# Patient Record
Sex: FEMALE | Race: WHITE | NOT HISPANIC OR LATINO | Employment: UNEMPLOYED | URBAN - METROPOLITAN AREA
[De-identification: names, ages, dates, MRNs, and addresses within clinical notes are randomized per-mention and may not be internally consistent; named-entity substitution may affect disease eponyms.]

---

## 2017-02-28 ENCOUNTER — HOSPITAL ENCOUNTER (EMERGENCY)
Facility: HOSPITAL | Age: 37
Discharge: HOME | End: 2017-02-28

## 2017-02-28 ENCOUNTER — APPOINTMENT (EMERGENCY)
Dept: RADIOLOGY | Facility: HOSPITAL | Age: 37
End: 2017-02-28

## 2017-02-28 VITALS
RESPIRATION RATE: 18 BRPM | HEART RATE: 104 BPM | OXYGEN SATURATION: 99 % | WEIGHT: 219 LBS | HEIGHT: 64 IN | BODY MASS INDEX: 37.39 KG/M2 | TEMPERATURE: 98.4 F | SYSTOLIC BLOOD PRESSURE: 144 MMHG | DIASTOLIC BLOOD PRESSURE: 80 MMHG
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
## 2017-02-28 DIAGNOSIS — M54.9 UPPER BACK PAIN: ICD-10-CM

## 2017-02-28 DIAGNOSIS — S16.1XXA CERVICAL STRAIN, INITIAL ENCOUNTER: Primary | ICD-10-CM

## 2017-02-28 PROCEDURE — 72040 X-RAY EXAM NECK SPINE 2-3 VW: CPT

## 2017-02-28 PROCEDURE — 99284 EMERGENCY DEPT VISIT MOD MDM: CPT

## 2017-02-28 PROCEDURE — 73030 X-RAY EXAM OF SHOULDER: CPT

## 2017-02-28 RX ORDER — METAXALONE 800 MG/1
800 TABLET ORAL 3 TIMES DAILY
Qty: 15 TABLET | Refills: 0 | Status: SHIPPED | OUTPATIENT
Start: 2017-02-28 | End: 2022-02-11

## 2017-02-28 RX ORDER — IBUPROFEN 600 MG/1
600 TABLET ORAL EVERY 6 HOURS PRN
Qty: 30 TABLET | Refills: 0 | Status: SHIPPED | OUTPATIENT
Start: 2017-02-28 | End: 2017-03-05

## 2017-02-28 RX ORDER — OXYCODONE HYDROCHLORIDE 5 MG/1
10 CAPSULE ORAL EVERY 6 HOURS PRN
COMMUNITY
End: 2022-02-11

## 2018-12-21 ENCOUNTER — OFFICE VISIT (OUTPATIENT)
Dept: URGENT CARE | Facility: MEDICAL CENTER | Age: 38
End: 2018-12-21
Payer: COMMERCIAL

## 2018-12-21 VITALS
SYSTOLIC BLOOD PRESSURE: 132 MMHG | BODY MASS INDEX: 36.43 KG/M2 | HEIGHT: 64 IN | RESPIRATION RATE: 20 BRPM | DIASTOLIC BLOOD PRESSURE: 78 MMHG | WEIGHT: 213.4 LBS | HEART RATE: 109 BPM | TEMPERATURE: 98.1 F | OXYGEN SATURATION: 99 %

## 2018-12-21 DIAGNOSIS — R05.9 COUGH: Primary | ICD-10-CM

## 2018-12-21 PROCEDURE — G0382 LEV 3 HOSP TYPE B ED VISIT: HCPCS | Performed by: PHYSICIAN ASSISTANT

## 2018-12-21 PROCEDURE — 99283 EMERGENCY DEPT VISIT LOW MDM: CPT | Performed by: PHYSICIAN ASSISTANT

## 2018-12-21 RX ORDER — SULFAMETHOXAZOLE AND TRIMETHOPRIM 800; 160 MG/1; MG/1
1 TABLET ORAL EVERY 12 HOURS SCHEDULED
Qty: 14 TABLET | Refills: 0 | Status: SHIPPED | OUTPATIENT
Start: 2018-12-21 | End: 2018-12-28

## 2018-12-21 RX ORDER — CETIRIZINE HYDROCHLORIDE, PSEUDOEPHEDRINE HYDROCHLORIDE 5; 120 MG/1; MG/1
TABLET, FILM COATED, EXTENDED RELEASE ORAL
COMMUNITY
Start: 2018-08-07

## 2018-12-21 RX ORDER — BENZONATATE 100 MG/1
100 CAPSULE ORAL 3 TIMES DAILY PRN
Qty: 20 CAPSULE | Refills: 0 | Status: SHIPPED | OUTPATIENT
Start: 2018-12-21 | End: 2022-02-11

## 2018-12-21 RX ORDER — KETOROLAC TROMETHAMINE 10 MG/1
TABLET, FILM COATED ORAL
COMMUNITY
End: 2022-02-11

## 2018-12-21 RX ORDER — BENZONATATE 200 MG/1
1 CAPSULE ORAL 3 TIMES DAILY PRN
COMMUNITY
Start: 2015-10-27 | End: 2022-02-11

## 2018-12-21 RX ORDER — ALBUTEROL SULFATE 90 UG/1
AEROSOL, METERED RESPIRATORY (INHALATION)
COMMUNITY

## 2018-12-21 RX ORDER — ALBUTEROL SULFATE 90 UG/1
2 AEROSOL, METERED RESPIRATORY (INHALATION) EVERY 6 HOURS PRN
Qty: 8.5 G | Refills: 0 | Status: SHIPPED | OUTPATIENT
Start: 2018-12-21

## 2018-12-22 NOTE — PROGRESS NOTES
[facility]        NAME: Carlin Bowie is a 41 y o  female  : 1980    MRN: [id number]  DATE: 2018  TIME: 9:43 PM    Assessment and Plan   Cough [R05]  1  Cough  sulfamethoxazole-trimethoprim (BACTRIM DS) 800-160 mg per tablet    albuterol (PROAIR HFA) 90 mcg/act inhaler         Patient Instructions       Follow up with PCP in 3-5 days  Proceed to  ER if symptoms worsen  Chief Complaint     Chief Complaint   Patient presents with    Cold Like Symptoms         History of Present Illness       HPI    Review of Systems   Review of Systems      Current Medications       Current Outpatient Prescriptions:     cetirizine-pseudoephedrine (ZyrTEC-D) 5-120 MG per tablet, TAKE 1 TABLET BY MOUTH 2 (TWO) TIMES A DAY FOR 10 DAYS , Disp: , Rfl:     lisdexamfetamine (VYVANSE) 70 MG capsule, Take 70 mg by mouth every morning, Disp: , Rfl:     Norethin-Eth Estrad-Fe Biphas (LO LOESTRIN FE) 1 MG-10 MCG / 10 MCG TABS, Take by mouth, Disp: , Rfl:     albuterol (PROAIR HFA) 90 mcg/act inhaler, Inhale 2 puffs every 6 (six) hours as needed for wheezing, Disp: 8 5 g, Rfl: 0    albuterol (PROVENTIL HFA,VENTOLIN HFA) 90 mcg/act inhaler, Inhale, Disp: , Rfl:     Ascorbic Acid (VITAMIN C) 100 MG tablet, Take 100 mg by mouth daily  , Disp: , Rfl:     benzonatate (TESSALON) 200 MG capsule, Take 1 capsule by mouth 3 (three) times a day as needed, Disp: , Rfl:     Biotin 300 MCG TABS, Take by mouth , Disp: , Rfl:     DULoxetine (CYMBALTA) 60 mg delayed release capsule, Take 60 mg by mouth daily  , Disp: , Rfl:     ibuprofen (MOTRIN) 600 mg tablet, Take 1 tablet by mouth every 6 (six) hours as needed for mild pain for up to 5 days, Disp: 30 tablet, Rfl: 0    ketorolac (TORADOL) 10 mg tablet, Take by mouth, Disp: , Rfl:     metaxalone (SKELAXIN) 800 mg tablet, Take 1 tablet by mouth 3 (three) times a day for 5 days, Disp: 15 tablet, Rfl: 0    oxyCODONE (OXY-IR) 5 MG capsule, Take 10 mg by mouth every 6 (six) hours as needed for moderate pain, Disp: , Rfl:     Specialty Vitamins Products (MAGNESIUM, AMINO ACID CHELATE,) 133 MG tablet, Take 1 tablet by mouth 2 (two) times a day , Disp: , Rfl:     sulfamethoxazole-trimethoprim (BACTRIM DS) 800-160 mg per tablet, Take 1 tablet by mouth every 12 (twelve) hours for 7 days, Disp: 14 tablet, Rfl: 0    Current Allergies     Allergies as of 12/21/2018 - Reviewed 12/21/2018   Allergen Reaction Noted    Penicillins Anaphylaxis 04/30/2016    Ambien [zolpidem tartrate]  04/30/2016    Amitriptyline Hives 04/30/2016    Benadryl [diphenhydramine hcl (sleep)]  04/30/2016    Ceclor [cefaclor] Hives 04/30/2016    Ciprofibrate Hives 04/20/2018    Doxycycline GI Intolerance 08/10/2015    Erythromycin Other (See Comments) 08/10/2015    Gabapentin Other (See Comments) 08/10/2015    Lyrica [pregabalin]  04/30/2016    Morphine  03/09/2017    Morphine and related  04/30/2016    Other  04/30/2016    Ultram [tramadol]  04/30/2016            The following portions of the patient's history were reviewed and updated as appropriate: allergies, current medications, past family history, past medical history, past social history, past surgical history and problem list      Past Medical History:   Diagnosis Date    ADHD (attention deficit hyperactivity disorder)     Allergic     Asthma     Chronic pain     Fibroids     Migraine     PCOS (polycystic ovarian syndrome)     RSD (reflex sympathetic dystrophy)        History reviewed  No pertinent surgical history  No family history on file  Medications have been verified          Objective   /78   Pulse (!) 109   Temp 98 1 °F (36 7 °C) (Temporal)   Resp 20   Ht 5' 4" (1 626 m)   Wt 96 8 kg (213 lb 6 4 oz)   LMP 12/16/2018 (Approximate)   SpO2 99%   BMI 36 63 kg/m²        Physical Exam     Physical Exam

## 2018-12-22 NOTE — PROGRESS NOTES
[facility]        NAME: Yesica Morse is a 41 y o  female  : 1980    MRN: [id number]  DATE: 2018  TIME: 9:43 PM    Assessment and Plan   Cough [R05]  1  Cough  sulfamethoxazole-trimethoprim (BACTRIM DS) 800-160 mg per tablet    albuterol (PROAIR HFA) 90 mcg/act inhaler         Patient Instructions     Cough  Bactrim DS twice daily x 7 days  proventil 2 puffs every 6 hours as needed  Follow up with PCP in 3-5 days  Proceed to  ER if symptoms worsen  Chief Complaint     Chief Complaint   Patient presents with    Cold Like Symptoms         History of Present Illness       42 y/o female c/o cough productive of yellow sputum x 3 weeks  Patient denies chest pain, SOB, n/v        Review of Systems   Review of Systems   Constitutional: Positive for fever  Negative for activity change, appetite change, chills, diaphoresis and fatigue  HENT: Positive for congestion and rhinorrhea  Negative for ear discharge, ear pain, facial swelling, hearing loss, mouth sores, nosebleeds, postnasal drip, sinus pain, sinus pressure, sneezing, sore throat and voice change  Respiratory: Positive for cough  Negative for apnea, choking, chest tightness, shortness of breath, wheezing and stridor  Cardiovascular: Negative  Current Medications       Current Outpatient Prescriptions:     cetirizine-pseudoephedrine (ZyrTEC-D) 5-120 MG per tablet, TAKE 1 TABLET BY MOUTH 2 (TWO) TIMES A DAY FOR 10 DAYS , Disp: , Rfl:     lisdexamfetamine (VYVANSE) 70 MG capsule, Take 70 mg by mouth every morning, Disp: , Rfl:     Norethin-Eth Estrad-Fe Biphas (LO LOESTRIN FE) 1 MG-10 MCG / 10 MCG TABS, Take by mouth, Disp: , Rfl:     albuterol (PROAIR HFA) 90 mcg/act inhaler, Inhale 2 puffs every 6 (six) hours as needed for wheezing, Disp: 8 5 g, Rfl: 0    albuterol (PROVENTIL HFA,VENTOLIN HFA) 90 mcg/act inhaler, Inhale, Disp: , Rfl:     Ascorbic Acid (VITAMIN C) 100 MG tablet, Take 100 mg by mouth daily  , Disp: , Rfl:     benzonatate (TESSALON) 200 MG capsule, Take 1 capsule by mouth 3 (three) times a day as needed, Disp: , Rfl:     Biotin 300 MCG TABS, Take by mouth , Disp: , Rfl:     DULoxetine (CYMBALTA) 60 mg delayed release capsule, Take 60 mg by mouth daily  , Disp: , Rfl:     ibuprofen (MOTRIN) 600 mg tablet, Take 1 tablet by mouth every 6 (six) hours as needed for mild pain for up to 5 days, Disp: 30 tablet, Rfl: 0    ketorolac (TORADOL) 10 mg tablet, Take by mouth, Disp: , Rfl:     metaxalone (SKELAXIN) 800 mg tablet, Take 1 tablet by mouth 3 (three) times a day for 5 days, Disp: 15 tablet, Rfl: 0    oxyCODONE (OXY-IR) 5 MG capsule, Take 10 mg by mouth every 6 (six) hours as needed for moderate pain, Disp: , Rfl:     Specialty Vitamins Products (MAGNESIUM, AMINO ACID CHELATE,) 133 MG tablet, Take 1 tablet by mouth 2 (two) times a day , Disp: , Rfl:     sulfamethoxazole-trimethoprim (BACTRIM DS) 800-160 mg per tablet, Take 1 tablet by mouth every 12 (twelve) hours for 7 days, Disp: 14 tablet, Rfl: 0    Current Allergies     Allergies as of 12/21/2018 - Reviewed 12/21/2018   Allergen Reaction Noted    Penicillins Anaphylaxis 04/30/2016    Ambien [zolpidem tartrate]  04/30/2016    Amitriptyline Hives 04/30/2016    Benadryl [diphenhydramine hcl (sleep)]  04/30/2016    Ceclor [cefaclor] Hives 04/30/2016    Ciprofibrate Hives 04/20/2018    Doxycycline GI Intolerance 08/10/2015    Erythromycin Other (See Comments) 08/10/2015    Gabapentin Other (See Comments) 08/10/2015    Lyrica [pregabalin]  04/30/2016    Morphine  03/09/2017    Morphine and related  04/30/2016    Other  04/30/2016    Ultram [tramadol]  04/30/2016            The following portions of the patient's history were reviewed and updated as appropriate: allergies, current medications, past family history, past medical history, past social history, past surgical history and problem list      Past Medical History:   Diagnosis Date    ADHD (attention deficit hyperactivity disorder)     Allergic     Asthma     Chronic pain     Fibroids     Migraine     PCOS (polycystic ovarian syndrome)     RSD (reflex sympathetic dystrophy)        History reviewed  No pertinent surgical history  No family history on file  Medications have been verified  Objective   /78   Pulse (!) 109   Temp 98 1 °F (36 7 °C) (Temporal)   Resp 20   Ht 5' 4" (1 626 m)   Wt 96 8 kg (213 lb 6 4 oz)   LMP 12/16/2018 (Approximate)   SpO2 99%   BMI 36 63 kg/m²        Physical Exam     Physical Exam   Constitutional: She appears well-developed and well-nourished  No distress  HENT:   Head: Normocephalic and atraumatic  Right Ear: Hearing, tympanic membrane, external ear and ear canal normal    Left Ear: Hearing, tympanic membrane, external ear and ear canal normal    Nose: Rhinorrhea present  Mouth/Throat: Uvula is midline, oropharynx is clear and moist and mucous membranes are normal    Neck: Normal range of motion  Neck supple  Cardiovascular: Normal rate, regular rhythm, normal heart sounds and intact distal pulses  Pulmonary/Chest: Effort normal and breath sounds normal    Lymphadenopathy:     She has cervical adenopathy  Skin: She is not diaphoretic

## 2018-12-22 NOTE — PATIENT INSTRUCTIONS
Cough  Bactrim DS twice daily x 7 days  proventil 2 puffs every 6 hours as needed  Follow up with PCP in 3-5 days  Proceed to  ER if symptoms worsen  Acute Cough   WHAT YOU NEED TO KNOW:   An acute cough can last up to 3 weeks  Common causes of an acute cough include a cold, allergies, or a lung infection  DISCHARGE INSTRUCTIONS:   Return to the emergency department if:   · You have trouble breathing or feel short of breath  · You cough up blood, or you see blood in your mucus  · You faint or feel weak or dizzy  · You have chest pain when you cough or take a deep breath  · You have new wheezing  Contact your healthcare provider if:   · You have a fever  · Your cough lasts longer than 4 weeks  · Your symptoms do not improve with treatment  · You have questions or concerns about your condition or care  Medicines:   · Medicines  may be needed to stop the cough, decrease swelling in your airways, or help open your airways  Medicine may also be given to help you cough up mucus  Ask your healthcare provider what over-the-counter medicines you can take  If you have an infection caused by bacteria, you may need antibiotics  · Take your medicine as directed  Contact your healthcare provider if you think your medicine is not helping or if you have side effects  Tell him or her if you are allergic to any medicine  Keep a list of the medicines, vitamins, and herbs you take  Include the amounts, and when and why you take them  Bring the list or the pill bottles to follow-up visits  Carry your medicine list with you in case of an emergency  Manage your symptoms:   · Do not smoke and stay away from others who smoke  Nicotine and other chemicals in cigarettes and cigars can cause lung damage and make your cough worse  Ask your healthcare provider for information if you currently smoke and need help to quit  E-cigarettes or smokeless tobacco still contain nicotine   Talk to your healthcare provider before you use these products  · Drink extra liquids as directed  Liquids will help thin and loosen mucus so you can cough it up  Liquids will also help prevent dehydration  Examples of good liquids to drink include water, fruit juice, and broth  Do not drink liquids that contain caffeine  Caffeine can increase your risk for dehydration  Ask your healthcare provider how much liquid to drink each day  · Rest as directed  Do not do activities that make your cough worse, such as exercise  · Use a humidifier or vaporizer  Use a cool mist humidifier or a vaporizer to increase air moisture in your home  This may make it easier for you to breathe and help decrease your cough  · Eat 2 to 5 mL of honey 2 times each day  Honey can help thin mucus and decrease your cough  · Use cough drops or lozenges  These can help decrease throat irritation and your cough  Follow up with your healthcare provider as directed:  Write down your questions so you remember to ask them during your visits  © 2017 [facility] Information is for End User's use only and may not be sold, redistributed or otherwise used for commercial purposes  All illustrations and images included in CareNotes® are the copyrighted property of [employer]  or [employer]  The above information is an  only  It is not intended as medical advice for individual conditions or treatments  Talk to your doctor, nurse or pharmacist before following any medical regimen to see if it is safe and effective for you

## 2019-06-10 ENCOUNTER — HOSPITAL ENCOUNTER (EMERGENCY)
Facility: HOSPITAL | Age: 39
Discharge: HOME | End: 2019-06-10
Attending: EMERGENCY MEDICINE | Admitting: EMERGENCY MEDICINE
Payer: COMMERCIAL

## 2019-06-10 VITALS
HEART RATE: 117 BPM | BODY MASS INDEX: 37.9 KG/M2 | SYSTOLIC BLOOD PRESSURE: 171 MMHG | DIASTOLIC BLOOD PRESSURE: 105 MMHG | TEMPERATURE: 98.4 F | RESPIRATION RATE: 20 BRPM | WEIGHT: 222 LBS | HEIGHT: 64 IN | OXYGEN SATURATION: 99 %

## 2019-06-10 DIAGNOSIS — R51.9 HEADACHE: Primary | ICD-10-CM

## 2019-06-10 PROCEDURE — 99283 EMERGENCY DEPT VISIT LOW MDM: CPT

## 2019-06-10 PROCEDURE — 99282 EMERGENCY DEPT VISIT SF MDM: CPT | Performed by: EMERGENCY MEDICINE

## 2020-03-09 ENCOUNTER — TELEPHONE (OUTPATIENT)
Dept: OBGYN CLINIC | Facility: CLINIC | Age: 40
End: 2020-03-09

## 2020-03-11 DIAGNOSIS — R10.2 PELVIC PAIN: Primary | ICD-10-CM

## 2020-04-02 ENCOUNTER — TELEMEDICINE (OUTPATIENT)
Dept: OBGYN CLINIC | Facility: CLINIC | Age: 40
End: 2020-04-02
Payer: COMMERCIAL

## 2020-04-02 DIAGNOSIS — Z30.011 ENCOUNTER FOR INITIAL PRESCRIPTION OF CONTRACEPTIVE PILLS: ICD-10-CM

## 2020-04-02 DIAGNOSIS — R14.0 ABDOMINAL DISTENSION: ICD-10-CM

## 2020-04-02 DIAGNOSIS — R10.2 PELVIC PAIN: Primary | ICD-10-CM

## 2020-04-02 PROCEDURE — G2012 BRIEF CHECK IN BY MD/QHP: HCPCS | Performed by: OBSTETRICS & GYNECOLOGY

## 2020-04-02 RX ORDER — NORETHINDRONE ACETATE AND ETHINYL ESTRADIOL 1MG-20(21)
1 KIT ORAL DAILY
Qty: 28 TABLET | Refills: 3 | Status: SHIPPED | OUTPATIENT
Start: 2020-04-02 | End: 2022-02-11

## 2020-04-02 RX ORDER — SENNOSIDES 8.6 MG
650 CAPSULE ORAL EVERY 8 HOURS PRN
Qty: 30 TABLET | Refills: 0 | Status: SHIPPED | OUTPATIENT
Start: 2020-04-02 | End: 2022-02-11

## 2020-04-07 ENCOUNTER — TELEPHONE (OUTPATIENT)
Dept: OBGYN CLINIC | Facility: CLINIC | Age: 40
End: 2020-04-07

## 2020-04-07 DIAGNOSIS — Z72.51 UNPROTECTED SEXUAL INTERCOURSE: Primary | ICD-10-CM

## 2020-04-08 RX ORDER — LEVONORGESTREL 1.5 MG/1
1.5 TABLET ORAL ONCE
Qty: 1 TABLET | Refills: 0 | Status: SHIPPED | OUTPATIENT
Start: 2020-04-08 | End: 2020-04-08

## 2020-04-21 ENCOUNTER — TELEPHONE (OUTPATIENT)
Dept: OBGYN CLINIC | Facility: CLINIC | Age: 40
End: 2020-04-21

## 2020-07-14 ENCOUNTER — HOSPITAL ENCOUNTER (OUTPATIENT)
Dept: CT IMAGING | Facility: CLINIC | Age: 40
Discharge: HOME | End: 2020-07-14
Payer: COMMERCIAL

## 2020-07-14 DIAGNOSIS — R10.2 PELVIC PAIN: ICD-10-CM

## 2020-07-14 DIAGNOSIS — R14.0 ABDOMINAL DISTENSION: ICD-10-CM

## 2020-07-14 PROCEDURE — 74176 CT ABD & PELVIS W/O CONTRAST: CPT

## 2020-07-22 ENCOUNTER — TELEPHONE (OUTPATIENT)
Dept: OBGYN CLINIC | Facility: CLINIC | Age: 40
End: 2020-07-22

## 2020-07-27 ENCOUNTER — CONSULT (OUTPATIENT)
Dept: OBGYN CLINIC | Facility: CLINIC | Age: 40
End: 2020-07-27
Payer: COMMERCIAL

## 2020-07-27 VITALS
BODY MASS INDEX: 42.34 KG/M2 | WEIGHT: 248 LBS | SYSTOLIC BLOOD PRESSURE: 138 MMHG | HEIGHT: 64 IN | TEMPERATURE: 97.6 F | DIASTOLIC BLOOD PRESSURE: 90 MMHG

## 2020-07-27 DIAGNOSIS — M62.08 DIASTASIS OF RECTUS ABDOMINIS: ICD-10-CM

## 2020-07-27 DIAGNOSIS — O34.219 DELIVERY WITH HISTORY OF C-SECTION: Primary | ICD-10-CM

## 2020-07-27 DIAGNOSIS — R10.9 ABDOMINAL PAIN, UNSPECIFIED ABDOMINAL LOCATION: Primary | ICD-10-CM

## 2020-07-27 DIAGNOSIS — I10 HYPERTENSION, UNSPECIFIED TYPE: ICD-10-CM

## 2020-07-27 PROCEDURE — 99213 OFFICE O/P EST LOW 20 MIN: CPT | Performed by: OBSTETRICS & GYNECOLOGY

## 2020-07-27 RX ORDER — LABETALOL 100 MG/1
100 TABLET, FILM COATED ORAL 2 TIMES DAILY
Qty: 60 TABLET | Refills: 1 | Status: SHIPPED | OUTPATIENT
Start: 2020-07-27 | End: 2021-01-08

## 2020-07-27 NOTE — PROGRESS NOTES
Assessment/Plan:     Diagnoses and all orders for this visit:    Abdominal pain, unspecified abdominal location  -     MRI pelvis w wo contrast; Future  -     MRI abdomen w wo contrast; Future    Diastasis of rectus abdominis  -     Ambulatory referral to General Surgery; Future      19-year-old female  Lower pelvic pain, upper abdominal pain  Diastasis of rectus muscle  Left adnexal mass  PCOS  Reflex sympathetic dystrophy  ADHD  Hypertension  Hypothyroid  Smoker  Prior  complicated by postpartum hemorrhage receive blood transfusion  Plan   CT scan results review with patient recommend surgical evaluation for possible reapproximation of her rectus muscle  MRI evaluate adnexal mass check for any abnormality  Will consider call manage with general surgeon secondary to her prior multiple adhesions at the time of her  and combined surgery for a rectus muscle reapproximation as well as removal of adnexal mass if show persistent or any abnormality at the MRI return to office in 4 weeks for annual exam and to discuss management plan  Subjective:      Patient ID: Irving Mai is a 47 y o  female      HPI  Irving Mai is a 42 y o  female who had emergency  secondary to severe preeclampsia in 2020 patient's  was complicated by bleeding hemorrhage receive blood transfusion post , patient also was having elevated blood pressure was taking labetalol   as per patient her last blood pressure was 130s over 80s without medication denies any headache blurry vision, patient said her menses become heavy since she gave birth lasted 7 days passing clots changing pad every 15-30 minute, patient started on oral contraceptive pills secondary to menorrhagia patient said her menses is not regular was having breakthrough bleeding on OCP but the heaviness of her bleeding decrease, patient desire to continue oral contraceptive pills       patient also still complaining of bulging in her lower abdominal area comes and go and distension happen on the upper part of the incision patient instructed to take photos before and after tissue was in her next visit CT scan results discussed with patient finding of diastasis of the rectus muscle discussed with patient recommend general surgery evaluation, patient also complaining of area in her lower abdomen that bulge back and forth possibility of endometriosis as well explained and discussed with patient patient denies any nausea or vomiting denies any constipation patient is having occasional diarrhea but her pain is not related to bowel movements or urinary function pain is increased with her menses  All finding explained and discussed with patient in details the plan will be to refer to general surgeon as well as order MRI evaluate adnexal mass then to return to office in 4 weeks after MRI to discuss results annual exam and call manage with general surgeon after evaluation plan explained and discussed with patient in details in the meanwhile also abdominal binder would be recommended all patient questions answered patient was satisfied time spent with patient was 15 minutes more than 50% of the time was face-to-face counseling            sReview of Systems      Objective:      /90 (BP Location: Left arm, Patient Position: Sitting)   Temp 97 6 °F (36 4 °C) (Temporal)   Ht 5' 4" (1 626 m)   Wt 112 kg (248 lb)   BMI 42 57 kg/m²          Physical Exam   Constitutional: She is oriented to person, place, and time  She appears well-developed and well-nourished  Cardiovascular: Normal rate, regular rhythm and normal heart sounds  Pulmonary/Chest: Effort normal and breath sounds normal    Abdominal: Soft  She exhibits no distension  There is no tenderness  Neurological: She is alert and oriented to person, place, and time  Psychiatric: She has a normal mood and affect   Her behavior is normal

## 2020-08-06 ENCOUNTER — TELEPHONE (OUTPATIENT)
Dept: OBGYN CLINIC | Facility: CLINIC | Age: 40
End: 2020-08-06

## 2020-08-06 NOTE — TELEPHONE ENCOUNTER
Prior Authorization started for Mri pelvic/abdomen  With [facility]   CPT code- mri pelvis- 14736  CPT code MRI abdomen 79089  icd 10 r10 9    Pending prior auth # [id number]  Faxed clinicals to [phone]

## 2021-01-08 DIAGNOSIS — I10 HYPERTENSION, UNSPECIFIED TYPE: ICD-10-CM

## 2021-01-08 RX ORDER — LABETALOL 100 MG/1
TABLET, FILM COATED ORAL
Qty: 60 TABLET | Refills: 1 | Status: SHIPPED | OUTPATIENT
Start: 2021-01-08 | End: 2022-02-11

## 2022-02-11 ENCOUNTER — OFFICE VISIT (OUTPATIENT)
Dept: INTERNAL MEDICINE CLINIC | Facility: CLINIC | Age: 42
End: 2022-02-11
Payer: COMMERCIAL

## 2022-02-11 VITALS
BODY MASS INDEX: 38.04 KG/M2 | DIASTOLIC BLOOD PRESSURE: 98 MMHG | SYSTOLIC BLOOD PRESSURE: 128 MMHG | WEIGHT: 222.8 LBS | HEIGHT: 64 IN | OXYGEN SATURATION: 98 % | HEART RATE: 118 BPM | TEMPERATURE: 98.9 F

## 2022-02-11 DIAGNOSIS — O24.419 GESTATIONAL DIABETES MELLITUS (GDM), ANTEPARTUM, GESTATIONAL DIABETES METHOD OF CONTROL UNSPECIFIED: ICD-10-CM

## 2022-02-11 DIAGNOSIS — E28.2 PCOS (POLYCYSTIC OVARIAN SYNDROME): ICD-10-CM

## 2022-02-11 DIAGNOSIS — Z11.4 ENCOUNTER FOR SCREENING FOR HIV: ICD-10-CM

## 2022-02-11 DIAGNOSIS — Z13.6 ENCOUNTER FOR LIPID SCREENING FOR CARDIOVASCULAR DISEASE: ICD-10-CM

## 2022-02-11 DIAGNOSIS — I10 HYPERTENSION, UNSPECIFIED TYPE: ICD-10-CM

## 2022-02-11 DIAGNOSIS — G90.50 RSD (REFLEX SYMPATHETIC DYSTROPHY): ICD-10-CM

## 2022-02-11 DIAGNOSIS — Z11.59 ENCOUNTER FOR HEPATITIS C SCREENING TEST FOR LOW RISK PATIENT: ICD-10-CM

## 2022-02-11 DIAGNOSIS — D64.9 ANEMIA, UNSPECIFIED TYPE: ICD-10-CM

## 2022-02-11 DIAGNOSIS — I10 PRIMARY HYPERTENSION: Primary | ICD-10-CM

## 2022-02-11 DIAGNOSIS — N80.0 ADENOMYOSIS: ICD-10-CM

## 2022-02-11 DIAGNOSIS — Z13.220 ENCOUNTER FOR LIPID SCREENING FOR CARDIOVASCULAR DISEASE: ICD-10-CM

## 2022-02-11 DIAGNOSIS — S39.011A STRAIN OF RECTUS ABDOMINIS MUSCLE, INITIAL ENCOUNTER: ICD-10-CM

## 2022-02-11 DIAGNOSIS — E55.9 VITAMIN D DEFICIENCY: ICD-10-CM

## 2022-02-11 DIAGNOSIS — M62.08 RECTUS DIASTASIS: ICD-10-CM

## 2022-02-11 PROCEDURE — 99204 OFFICE O/P NEW MOD 45 MIN: CPT | Performed by: INTERNAL MEDICINE

## 2022-02-11 RX ORDER — LABETALOL 100 MG/1
100 TABLET, FILM COATED ORAL 2 TIMES DAILY
Qty: 60 TABLET | Refills: 1 | Status: SHIPPED | OUTPATIENT
Start: 2022-02-11

## 2022-02-11 RX ORDER — DEXTROAMPHETAMINE SACCHARATE, AMPHETAMINE ASPARTATE, DEXTROAMPHETAMINE SULFATE AND AMPHETAMINE SULFATE 2.5; 2.5; 2.5; 2.5 MG/1; MG/1; MG/1; MG/1
TABLET ORAL
COMMUNITY
Start: 2022-01-26

## 2022-02-11 RX ORDER — BUPROPION HYDROCHLORIDE 150 MG/1
TABLET ORAL
COMMUNITY
Start: 2021-12-14
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
## 2022-08-12 DIAGNOSIS — I10 HYPERTENSION, UNSPECIFIED TYPE: ICD-10-CM

## 2022-08-12 RX ORDER — LABETALOL 100 MG/1
100 TABLET, FILM COATED ORAL 2 TIMES DAILY
Qty: 60 TABLET | Refills: 1 | Status: SHIPPED | OUTPATIENT
Start: 2022-08-12

## 2022-11-27 ENCOUNTER — HOSPITAL ENCOUNTER (EMERGENCY)
Facility: HOSPITAL | Age: 42
Discharge: HOME | End: 2022-11-27
Attending: EMERGENCY MEDICINE

## 2022-11-27 VITALS
DIASTOLIC BLOOD PRESSURE: 103 MMHG | TEMPERATURE: 98.4 F | SYSTOLIC BLOOD PRESSURE: 186 MMHG | OXYGEN SATURATION: 99 % | HEART RATE: 102 BPM | RESPIRATION RATE: 18 BRPM

## 2022-11-27 DIAGNOSIS — K04.7 DENTAL ABSCESS: Primary | ICD-10-CM

## 2022-11-27 DIAGNOSIS — K08.89 PAIN, DENTAL: ICD-10-CM

## 2022-11-27 RX ORDER — ACETAMINOPHEN 325 MG/1
975 TABLET ORAL ONCE
Status: COMPLETED | OUTPATIENT
Start: 2022-11-27 | End: 2022-11-27

## 2022-11-27 RX ORDER — KETOROLAC TROMETHAMINE 30 MG/ML
30 INJECTION, SOLUTION INTRAMUSCULAR; INTRAVENOUS ONCE
Status: COMPLETED | OUTPATIENT
Start: 2022-11-27 | End: 2022-11-27

## 2022-11-27 RX ORDER — LEVOFLOXACIN 750 MG/1
750 TABLET ORAL EVERY 24 HOURS
Qty: 10 TABLET | Refills: 0 | Status: SHIPPED | OUTPATIENT
Start: 2022-11-27 | End: 2022-12-07

## 2022-11-27 RX ORDER — ONDANSETRON 4 MG/1
4 TABLET, ORALLY DISINTEGRATING ORAL ONCE
Status: COMPLETED | OUTPATIENT
Start: 2022-11-27 | End: 2022-11-27

## 2022-11-27 RX ORDER — METRONIDAZOLE 500 MG/1
500 TABLET ORAL ONCE
Status: COMPLETED | OUTPATIENT
Start: 2022-11-27 | End: 2022-11-27

## 2022-11-27 RX ORDER — METRONIDAZOLE 500 MG/1
500 TABLET ORAL EVERY 8 HOURS SCHEDULED
Qty: 30 TABLET | Refills: 0 | Status: SHIPPED | OUTPATIENT
Start: 2022-11-27 | End: 2022-12-07

## 2022-11-27 RX ORDER — ONDANSETRON 4 MG/1
4 TABLET, FILM COATED ORAL EVERY 6 HOURS
Qty: 12 TABLET | Refills: 0 | Status: SHIPPED | OUTPATIENT
Start: 2022-11-27

## 2022-11-27 RX ADMIN — KETOROLAC TROMETHAMINE 30 MG: 30 INJECTION, SOLUTION INTRAMUSCULAR at 21:34

## 2022-11-27 RX ADMIN — ACETAMINOPHEN 975 MG: 325 TABLET ORAL at 21:33

## 2022-11-27 RX ADMIN — LEVOFLOXACIN 750 MG: 500 TABLET, FILM COATED ORAL at 21:33

## 2022-11-27 RX ADMIN — ONDANSETRON 4 MG: 4 TABLET, ORALLY DISINTEGRATING ORAL at 21:33

## 2022-11-27 RX ADMIN — METRONIDAZOLE 500 MG: 500 TABLET ORAL at 21:33

## 2022-11-28 NOTE — ED PROVIDER NOTES
History  Chief Complaint   Patient presents with   • Abscess     Pt arrived ambulatory with c/o " abscess in L lower jaw"  Pt was recently on abx finished 2 weeks for the same, worsening past week  57-year-old female history PCOS presenting with dental pain and concern for infection  Patient reports 2 weeks of relatively stable left lower molar pain and discomfort with firm area to palpation along left lower jaw  Denies any trismus or difficulty opening mouth or any difficulty swallowing or breathing  Denies any systemic symptoms such as fevers or chills  Denies any chest pain shortness a breath  Reports intermittent nausea without vomiting  Denies any diarrhea  Was on clinda recently without improvement  Reports historically Levaquin and Flagyl improve her symptoms  Does not currently have a dentist since her dentist retired  Denies any other complaints  Chart reviewed  Past Medical History:  No date: ADHD (attention deficit hyperactivity disorder)  No date: Allergic  No date: Arthritis  No date: Asthma  No date: Chronic pain  12/27/2019: Clotting disorder ([facility] [address] )      Comment:  Post-partum hemorrhage  03/2007: Depression      Comment:  Following spinal injury  No date: Fibroids  1997/1998: Headache(784 0)  No date: Hypertension  No date: Hypothyroidism  No date: Migraine  No date: Obesity  04/2018: Papanicolaou smear for cervical cancer screening  No date: PCOS (polycystic ovarian syndrome)  No date: RSD (reflex sympathetic dystrophy)  No date: Spinal stenosis  Family History: non-contributory  Social History            Prior to Admission Medications   Prescriptions Last Dose Informant Patient Reported? Taking? Ascorbic Acid (VITAMIN C) 100 MG tablet   Yes No   Sig: Take 100 mg by mouth daily  Biotin 300 MCG TABS   Yes No   Sig: Take by mouth  Specialty Vitamins Products (MAGNESIUM, AMINO ACID CHELATE,) 133 MG tablet   Yes No   Sig: Take 1 tablet by mouth 2 (two) times a day     albuterol (PROAIR HFA) 90 mcg/act inhaler   No No   Sig: Inhale 2 puffs every 6 (six) hours as needed for wheezing   albuterol (PROVENTIL HFA,VENTOLIN HFA) 90 mcg/act inhaler   Yes No   Sig: Inhale   amphetamine-dextroamphetamine (ADDERALL) 10 mg tablet   Yes No   Sig: TAKE 1 TABLET BY MOUTH DAILY AT 12 PM   buPROPion (WELLBUTRIN XL) 150 mg 24 hr tablet   Yes No   cetirizine-pseudoephedrine (ZyrTEC-D) 5-120 MG per tablet   Yes No   Sig: TAKE 1 TABLET BY MOUTH 2 (TWO) TIMES A DAY FOR 10 DAYS    ibuprofen (MOTRIN) 600 mg tablet   No No   Sig: Take 1 tablet by mouth every 6 (six) hours as needed for mild pain for up to 5 days   labetalol (NORMODYNE) 100 mg tablet   No No   Sig: Take 1 tablet (100 mg total) by mouth 2 (two) times a day   lisdexamfetamine (VYVANSE) 70 MG capsule   Yes No   Sig: Take 70 mg by mouth every morning      Facility-Administered Medications: None       Past Medical History:   Diagnosis Date   • ADHD (attention deficit hyperactivity disorder)    • Allergic    • Arthritis    • Asthma    • Chronic pain    • Clotting disorder ([facility]) 2019    Post-partum hemorrhage   • Depression 2007    Following spinal injury   • Fibroids    • Headache(784 0)    • Hypertension    • Hypothyroidism    • Migraine    • Obesity    • Papanicolaou smear for cervical cancer screening 2018   • PCOS (polycystic ovarian syndrome)    • RSD (reflex sympathetic dystrophy)    • Spinal stenosis        Past Surgical History:   Procedure Laterality Date   •  SECTION  2019       Family History   Problem Relation Age of Onset   • Alcohol abuse Mother    • Heart disease Sister    • Diabetes Maternal Grandmother    • Esophageal cancer Maternal Grandfather    • Osteoporosis Paternal Grandmother    • Hypertension Paternal Grandmother    • Esophageal cancer Paternal Grandfather    • Lung cancer Other      I have reviewed and agree with the history as documented      E-Cigarette/Vaping   • E-Cigarette Use Never User E-Cigarette/Vaping Substances     Social History     Tobacco Use   • Smoking status: Former     Packs/day: 0 25     Years: 0 00     Pack years: 0 00     Types: Cigarettes   • Smokeless tobacco: Never   Vaping Use   • Vaping Use: Never used   Substance Use Topics   • Alcohol use: Not Currently     Alcohol/week: 0 0 standard drinks     Comment: socially   • Drug use: No       Review of Systems   Constitutional: Negative for appetite change, chills, diaphoresis, fever and unexpected weight change  HENT: Positive for dental problem  Negative for congestion and rhinorrhea  Eyes: Negative for photophobia and visual disturbance  Respiratory: Negative for cough, chest tightness and shortness of breath  Cardiovascular: Negative for chest pain, palpitations and leg swelling  Gastrointestinal: Negative for abdominal distention, abdominal pain, blood in stool, constipation, diarrhea, nausea and vomiting  Genitourinary: Negative for dysuria and hematuria  Musculoskeletal: Negative for back pain, joint swelling, neck pain and neck stiffness  Skin: Negative for color change, pallor, rash and wound  Neurological: Negative for dizziness, syncope, weakness, light-headedness and headaches  Psychiatric/Behavioral: Negative for agitation  All other systems reviewed and are negative  Physical Exam  Physical Exam  Vitals and nursing note reviewed  Constitutional:       General: She is not in acute distress  Appearance: Normal appearance  She is well-developed  She is not ill-appearing, toxic-appearing or diaphoretic  HENT:      Head: Normocephalic and atraumatic  Nose: Nose normal  No congestion or rhinorrhea  Mouth/Throat:      Mouth: Mucous membranes are moist       Pharynx: Oropharynx is clear  No oropharyngeal exudate or posterior oropharyngeal erythema  Comments: No trismus  Able to open mouth fully without pain or difficulty  No cervical lymphadenopathy    Small area swelling left lower jaw with overlying tenderness  Eyes:      General: No scleral icterus  Right eye: No discharge  Left eye: No discharge  Extraocular Movements: Extraocular movements intact  Conjunctiva/sclera: Conjunctivae normal       Pupils: Pupils are equal, round, and reactive to light  Neck:      Vascular: No JVD  Trachea: No tracheal deviation  Comments: Supple  Normal range of motion  Cardiovascular:      Rate and Rhythm: Normal rate and regular rhythm  Heart sounds: Normal heart sounds  No murmur heard  No friction rub  No gallop  Comments: Normal rate and regular rhythm  Pulmonary:      Effort: Pulmonary effort is normal  No respiratory distress  Breath sounds: Normal breath sounds  No stridor  No wheezing or rales  Comments: Clear to auscultation bilaterally  Chest:      Chest wall: No tenderness  Abdominal:      General: Bowel sounds are normal  There is no distension  Palpations: Abdomen is soft  Tenderness: There is no abdominal tenderness  There is no right CVA tenderness, left CVA tenderness, guarding or rebound  Comments: Soft, nontender, nondistended  Normal bowel sounds throughout   Musculoskeletal:         General: No swelling, tenderness, deformity or signs of injury  Normal range of motion  Cervical back: Normal range of motion and neck supple  No rigidity  No muscular tenderness  Right lower leg: No edema  Left lower leg: No edema  Lymphadenopathy:      Cervical: No cervical adenopathy  Skin:     General: Skin is warm and dry  Coloration: Skin is not pale  Findings: No erythema or rash  Neurological:      General: No focal deficit present  Mental Status: She is alert  Mental status is at baseline  Sensory: No sensory deficit  Motor: No weakness or abnormal muscle tone  Coordination: Coordination normal       Gait: Gait normal       Comments: Alert    Strength and sensation grossly intact  Ambulatory without difficulty at baseline  Psychiatric:         Behavior: Behavior normal          Thought Content: Thought content normal          Vital Signs  ED Triage Vitals [11/27/22 2102]   Temperature Pulse Respirations Blood Pressure SpO2   98 4 °F (36 9 °C) 102 18 (!) 186/103 99 %      Temp Source Heart Rate Source Patient Position - Orthostatic VS BP Location FiO2 (%)   Oral Monitor Sitting Left arm --      Pain Score       6           Vitals:    11/27/22 2102   BP: (!) 186/103   Pulse: 102   Patient Position - Orthostatic VS: Sitting         Visual Acuity      ED Medications  Medications   levofloxacin (LEVAQUIN) tablet 750 mg (750 mg Oral Given 11/27/22 2133)   metroNIDAZOLE (FLAGYL) tablet 500 mg (500 mg Oral Given 11/27/22 2133)   acetaminophen (TYLENOL) tablet 975 mg (975 mg Oral Given 11/27/22 2133)   ketorolac (TORADOL) injection 30 mg (30 mg Intramuscular Given 11/27/22 2134)   ondansetron (ZOFRAN-ODT) dispersible tablet 4 mg (4 mg Oral Given 11/27/22 2133)       Diagnostic Studies  Results Reviewed     None                 No orders to display              Procedures  Procedures         ED Course                               SBIRT 20yo+    Flowsheet Row Most Recent Value   SBIRT (25 yo +)    In order to provide better care to our patients, we are screening all of our patients for alcohol and drug use  Would it be okay to ask you these screening questions? Yes Filed at: 11/27/2022 2143   Initial Alcohol Screen: US AUDIT-C     1  How often do you have a drink containing alcohol? 1 Filed at: 11/27/2022 2143   2  How many drinks containing alcohol do you have on a typical day you are drinking? 0 Filed at: 11/27/2022 2143   3b  FEMALE Any Age, or MALE 65+: How often do you have 4 or more drinks on one occassion? 0 Filed at: 11/27/2022 2143   Audit-C Score 1 Filed at: 11/27/2022 2143   SURENDRA: How many times in the past year have you        Used an illegal drug or used a prescription medication for non-medical reasons? Never Filed at: 11/27/2022 2143                    MDM  Number of Diagnoses or Management Options  Dental abscess  Pain, dental  Diagnosis management comments: 51-year-old female history PCOS presenting with dental pain and concern for infection  Tolerating p o  Without trismus or any difficulty swallowing or breathing  Plan for antibiotics and symptom management with oral Tylenol and IM Toradol  Reassess  Symptoms improved after medications  Prescriptions sent to pharmacy  Discussed results and recommendations  Advised follow up PCP and dentist/[facility]  Medication recommendations  Given instructions and return precautions  Patient/family at bedside acknowledged understanding of all written and verbal instructions and return precautions  Discharged  Amount and/or Complexity of Data Reviewed  Clinical lab tests: reviewed  Tests in the radiology section of CPT®: reviewed  Tests in the medicine section of CPT®: reviewed  Review and summarize past medical records: yes  Independent visualization of images, tracings, or specimens: yes    Risk of Complications, Morbidity, and/or Mortality  Presenting problems: moderate  Diagnostic procedures: moderate  Management options: moderate    Patient Progress  Patient progress: improved      Disposition  Final diagnoses:   Dental abscess   Pain, dental     Time reflects when diagnosis was documented in both MDM as applicable and the Disposition within this note     Time User Action Codes Description Comment    11/27/2022  9:20 PM Randallenia Listen Add [K04 7] Dental abscess     11/27/2022  9:20 PM Rogenia Listen Add [K08 89] Pain, dental       ED Disposition     ED Disposition   Discharge    Condition   Stable    Date/Time   Sun Nov 27, 2022  9:20 PM    Comment   Adelfo Diehl discharge to home/self care                 Follow-up Information     Follow up With Specialties Details Why Mallory Gonzales MD Internal Medicine Schedule an appointment as soon as possible for a visit in 1 week  [address] [facility] [id number] [facility]  Schedule an appointment as soon as possible for a visit in 1 day  [address]    [facility] [place]  Schedule an appointment as soon as possible for a visit in 1 day  [address] [facility] [address]          Patient's Medications   Discharge Prescriptions    LEVOFLOXACIN (LEVAQUIN) 750 MG TABLET    Take 1 tablet (750 mg total) by mouth every 24 hours for 10 days       Start Date: 11/27/2022End Date: 12/7/2022       Order Dose: 750 mg       Quantity: 10 tablet    Refills: 0    METRONIDAZOLE (FLAGYL) 500 MG TABLET    Take 1 tablet (500 mg total) by mouth every 8 (eight) hours for 10 days       Start Date: 11/27/2022End Date: 12/7/2022       Order Dose: 500 mg       Quantity: 30 tablet    Refills: 0    ONDANSETRON (ZOFRAN) 4 MG TABLET    Take 1 tablet (4 mg total) by mouth every 6 (six) hours       Start Date: 11/27/2022End Date: --       Order Dose: 4 mg       Quantity: 12 tablet    Refills: 0       No discharge procedures on file      PDMP Review     None          ED Provider  Electronically Signed by           Mary Pride MD  11/27/22 2960

## 2022-11-28 NOTE — DISCHARGE INSTRUCTIONS
Please follow up PCP and dentist/[facility]  Recommend tylenol 650 mg and ibuprofen 600 mg every 6 hours as needed for pain  Please return for severe chest pain, significant shortness of breath, severely worsening symptoms, or any other concerning signs or symptoms  Please refer to the following documents for additional instructions and return precautions

## 2023-02-20 DIAGNOSIS — I10 HYPERTENSION, UNSPECIFIED TYPE: ICD-10-CM

## 2023-02-20 RX ORDER — LABETALOL 100 MG/1
100 TABLET, FILM COATED ORAL 2 TIMES DAILY
Qty: 60 TABLET | Refills: 0 | Status: SHIPPED | OUTPATIENT
Start: 2023-02-20 | End: 2023-03-22

## 2024-01-22 ENCOUNTER — APPOINTMENT (EMERGENCY)
Dept: RADIOLOGY | Facility: HOSPITAL | Age: 44
End: 2024-01-22
Payer: COMMERCIAL

## 2024-01-22 ENCOUNTER — HOSPITAL ENCOUNTER (EMERGENCY)
Facility: HOSPITAL | Age: 44
Discharge: HOME | End: 2024-01-22
Attending: EMERGENCY MEDICINE | Admitting: EMERGENCY MEDICINE
Payer: COMMERCIAL

## 2024-01-22 VITALS
BODY MASS INDEX: 43.13 KG/M2 | DIASTOLIC BLOOD PRESSURE: 108 MMHG | HEIGHT: 64 IN | OXYGEN SATURATION: 100 % | HEART RATE: 115 BPM | SYSTOLIC BLOOD PRESSURE: 180 MMHG | WEIGHT: 252.65 LBS | RESPIRATION RATE: 18 BRPM | TEMPERATURE: 97.5 F

## 2024-01-22 DIAGNOSIS — R06.00 DYSPNEA: Primary | ICD-10-CM

## 2024-01-22 DIAGNOSIS — J45.901 ASTHMA EXACERBATION: ICD-10-CM

## 2024-01-22 PROCEDURE — 94640 AIRWAY INHALATION TREATMENT: CPT

## 2024-01-22 PROCEDURE — 99283 EMERGENCY DEPT VISIT LOW MDM: CPT

## 2024-01-22 PROCEDURE — 71046 X-RAY EXAM CHEST 2 VIEWS: CPT

## 2024-01-22 PROCEDURE — 99284 EMERGENCY DEPT VISIT MOD MDM: CPT | Performed by: EMERGENCY MEDICINE

## 2024-01-22 RX ORDER — ALBUTEROL SULFATE 90 UG/1
2 AEROSOL, METERED RESPIRATORY (INHALATION) EVERY 6 HOURS PRN
Qty: 8.5 G | Refills: 0 | Status: SHIPPED | OUTPATIENT
Start: 2024-01-22

## 2024-01-22 RX ORDER — IPRATROPIUM BROMIDE AND ALBUTEROL SULFATE 2.5; .5 MG/3ML; MG/3ML
3 SOLUTION RESPIRATORY (INHALATION) ONCE
Status: COMPLETED | OUTPATIENT
Start: 2024-01-22 | End: 2024-01-22

## 2024-01-22 RX ORDER — PREDNISONE 20 MG/1
60 TABLET ORAL DAILY
Qty: 12 TABLET | Refills: 0 | Status: SHIPPED | OUTPATIENT
Start: 2024-01-22 | End: 2024-01-26

## 2024-01-22 RX ORDER — PREDNISONE 20 MG/1
60 TABLET ORAL ONCE
Status: COMPLETED | OUTPATIENT
Start: 2024-01-22 | End: 2024-01-22

## 2024-01-22 RX ORDER — ALBUTEROL SULFATE 90 UG/1
2 AEROSOL, METERED RESPIRATORY (INHALATION) ONCE
Status: COMPLETED | OUTPATIENT
Start: 2024-01-22 | End: 2024-01-22

## 2024-01-22 RX ADMIN — PREDNISONE 60 MG: 20 TABLET ORAL at 03:21

## 2024-01-22 RX ADMIN — IPRATROPIUM BROMIDE AND ALBUTEROL SULFATE 3 ML: 2.5; .5 SOLUTION RESPIRATORY (INHALATION) at 03:22

## 2024-01-22 RX ADMIN — ALBUTEROL SULFATE 2 PUFF: 90 AEROSOL, METERED RESPIRATORY (INHALATION) at 04:22
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
## 2024-01-22 NOTE — Clinical Note
Ana Diehl was seen and treated in our emergency department on 1/22/2024.                Diagnosis:     Ana  .    She may return on this date: 01/25/2024         If you have any questions or concerns, please don't hesitate to call.      Doris Marshall RN    ______________________________           _______________          _______________  Hospital Representative                              Date                                Time

## 2024-01-25 ENCOUNTER — HOSPITAL ENCOUNTER (EMERGENCY)
Facility: HOSPITAL | Age: 44
Discharge: HOME | End: 2024-01-26
Attending: EMERGENCY MEDICINE | Admitting: EMERGENCY MEDICINE
Payer: COMMERCIAL

## 2024-01-25 ENCOUNTER — APPOINTMENT (EMERGENCY)
Dept: RADIOLOGY | Facility: HOSPITAL | Age: 44
End: 2024-01-25
Payer: COMMERCIAL

## 2024-01-25 DIAGNOSIS — J45.901 ASTHMA EXACERBATION: Primary | ICD-10-CM

## 2024-01-25 DIAGNOSIS — I10 HYPERTENSION: ICD-10-CM

## 2024-01-25 PROCEDURE — 99283 EMERGENCY DEPT VISIT LOW MDM: CPT

## 2024-01-25 PROCEDURE — 71045 X-RAY EXAM CHEST 1 VIEW: CPT

## 2024-01-25 PROCEDURE — 94640 AIRWAY INHALATION TREATMENT: CPT

## 2024-01-25 RX ORDER — LABETALOL 100 MG/1
200 TABLET, FILM COATED ORAL ONCE
Status: COMPLETED | OUTPATIENT
Start: 2024-01-26 | End: 2024-01-25

## 2024-01-25 RX ORDER — ALBUTEROL SULFATE 2.5 MG/3ML
5 SOLUTION RESPIRATORY (INHALATION) ONCE
Status: COMPLETED | OUTPATIENT
Start: 2024-01-25 | End: 2024-01-25

## 2024-01-25 RX ADMIN — LABETALOL HYDROCHLORIDE 200 MG: 100 TABLET, FILM COATED ORAL at 23:50

## 2024-01-25 RX ADMIN — IPRATROPIUM BROMIDE 0.5 MG: 0.5 SOLUTION RESPIRATORY (INHALATION) at 23:41

## 2024-01-25 RX ADMIN — ALBUTEROL SULFATE 5 MG: 2.5 SOLUTION RESPIRATORY (INHALATION) at 23:41

## 2024-01-25 NOTE — Clinical Note
Ana Diehl was seen and treated in our emergency department on 1/25/2024.    No restrictions    Other - See Comments    unable to work in moldy conditions    Diagnosis: asthma exacerbation    Ana  may return to work on return date.    She may return on this date: 02/05/2024         If you have any questions or concerns, please don't hesitate to call.      Noris Mckeon, DO    ______________________________           _______________          _______________  Hospital Representative                              Date                                Time

## 2024-01-26 VITALS
DIASTOLIC BLOOD PRESSURE: 64 MMHG | RESPIRATION RATE: 18 BRPM | TEMPERATURE: 99.3 F | OXYGEN SATURATION: 98 % | HEART RATE: 105 BPM | SYSTOLIC BLOOD PRESSURE: 111 MMHG

## 2024-01-26 PROCEDURE — 99284 EMERGENCY DEPT VISIT MOD MDM: CPT | Performed by: EMERGENCY MEDICINE

## 2024-01-26 RX ORDER — LABETALOL 100 MG/1
100 TABLET, FILM COATED ORAL 2 TIMES DAILY
Qty: 60 TABLET | Refills: 0 | Status: SHIPPED | OUTPATIENT
Start: 2024-01-26

## 2024-01-26 RX ORDER — ALBUTEROL SULFATE 2.5 MG/3ML
2.5 SOLUTION RESPIRATORY (INHALATION) ONCE
Status: COMPLETED | OUTPATIENT
Start: 2024-01-26 | End: 2024-01-26

## 2024-01-26 RX ORDER — ALBUTEROL SULFATE 2.5 MG/3ML
2.5 SOLUTION RESPIRATORY (INHALATION) EVERY 6 HOURS PRN
Qty: 75 ML | Refills: 0 | Status: SHIPPED | OUTPATIENT
Start: 2024-01-26

## 2024-01-26 RX ORDER — NAPROXEN 250 MG/1
500 TABLET ORAL ONCE
Status: COMPLETED | OUTPATIENT
Start: 2024-01-26 | End: 2024-01-26

## 2024-01-26 RX ADMIN — ALBUTEROL SULFATE 2.5 MG: 2.5 SOLUTION RESPIRATORY (INHALATION) at 01:18

## 2024-01-26 RX ADMIN — NAPROXEN 500 MG: 250 TABLET ORAL at 01:18
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
## 2024-04-11 ENCOUNTER — VBI (OUTPATIENT)
Dept: ADMINISTRATIVE | Facility: OTHER | Age: 44
End: 2024-04-11

## 2024-07-18 ENCOUNTER — APPOINTMENT (EMERGENCY)
Dept: RADIOLOGY | Facility: HOSPITAL | Age: 44
End: 2024-07-18
Payer: COMMERCIAL

## 2024-07-18 ENCOUNTER — HOSPITAL ENCOUNTER (EMERGENCY)
Facility: HOSPITAL | Age: 44
Discharge: HOME | End: 2024-07-18
Attending: EMERGENCY MEDICINE
Payer: COMMERCIAL

## 2024-07-18 VITALS
SYSTOLIC BLOOD PRESSURE: 207 MMHG | TEMPERATURE: 98 F | WEIGHT: 243.17 LBS | DIASTOLIC BLOOD PRESSURE: 102 MMHG | HEART RATE: 105 BPM | BODY MASS INDEX: 41.42 KG/M2 | RESPIRATION RATE: 20 BRPM | OXYGEN SATURATION: 97 %

## 2024-07-18 DIAGNOSIS — I10 HYPERTENSION: ICD-10-CM

## 2024-07-18 DIAGNOSIS — M79.672 FOOT PAIN, LEFT: Primary | ICD-10-CM

## 2024-07-18 PROCEDURE — 99283 EMERGENCY DEPT VISIT LOW MDM: CPT

## 2024-07-18 PROCEDURE — 99284 EMERGENCY DEPT VISIT MOD MDM: CPT

## 2024-07-18 PROCEDURE — 73630 X-RAY EXAM OF FOOT: CPT

## 2024-07-18 RX ORDER — LABETALOL 100 MG/1
100 TABLET, FILM COATED ORAL 2 TIMES DAILY
Qty: 60 TABLET | Refills: 0 | Status: SHIPPED | OUTPATIENT
Start: 2024-07-18

## 2024-07-18 NOTE — ED PROVIDER NOTES
"History  Chief Complaint   Patient presents with    Foot Pain     Patient ambulatory to triage states \" I think I broke my left foot for the 8th time\", no trauma to foot per patient    + pulses     The patient is a 44 y.o. female who presents to [facility] with a chief complaint of left foot pain. Symptoms began yesterday and have been worsening since onset. Her pain is currently rated as a 8/10 in severity and described as sharp without radiation. Associated symptoms include pain with weight bearing, movement and palpation. The patient denies, chills, night sweats, chest pain or shortness of breath, wheezing, cough, sputum, falls, trauma, numbness, tingling, deformity, ecchymosis, gait abnormality, coldness of extremity. No other reported symptoms at this time.  Patient states that she has an extensive history of breaking the left foot        History provided by:  Patient   used: No        Prior to Admission Medications   Prescriptions Last Dose Informant Patient Reported? Taking?   Ascorbic Acid (VITAMIN C) 100 MG tablet  Self Yes No   Sig: Take 100 mg by mouth daily.   Biotin 300 MCG TABS  Self Yes No   Sig: Take by mouth.   Specialty Vitamins Products (MAGNESIUM, AMINO ACID CHELATE,) 133 MG tablet  Self Yes No   Sig: Take 1 tablet by mouth 2 (two) times a day.   albuterol (2.5 mg/3 mL) 0.083 % nebulizer solution   No No   Sig: Take 3 mL (2.5 mg total) by nebulization every 6 (six) hours as needed for wheezing or shortness of breath   albuterol (ProAir HFA) 90 mcg/act inhaler   No No   Sig: Inhale 2 puffs every 6 (six) hours as needed for wheezing   amphetamine-dextroamphetamine (ADDERALL) 10 mg tablet   Yes No   Sig: TAKE 1 TABLET BY MOUTH DAILY AT 12 PM   buPROPion (WELLBUTRIN XL) 150 mg 24 hr tablet   Yes No   cetirizine-pseudoephedrine (ZyrTEC-D) 5-120 MG per tablet  Self Yes No   Sig: TAKE 1 TABLET BY MOUTH 2 (TWO) TIMES A DAY FOR 10 DAYS.   ibuprofen (MOTRIN) 600 mg " tablet   No No   Sig: Take 1 tablet by mouth every 6 (six) hours as needed for mild pain for up to 5 days   labetalol (NORMODYNE) 100 mg tablet   No No   Sig: Take 1 tablet (100 mg total) by mouth 2 (two) times a day   lisdexamfetamine (VYVANSE) 70 MG capsule   Yes No   Sig: Take 70 mg by mouth every morning   ondansetron (ZOFRAN) 4 mg tablet   No No   Sig: Take 1 tablet (4 mg total) by mouth every 6 (six) hours      Facility-Administered Medications: None       Past Medical History:   Diagnosis Date    ADHD (attention deficit hyperactivity disorder)     Allergic     Arthritis     Asthma     Chronic pain     Clotting disorder (HCC) 2019    Post-partum hemorrhage    Depression 2007    Following spinal injury    Fibroids     Headache(784.0)     Hypertension     Hypothyroidism     Migraine     Obesity     Papanicolaou smear for cervical cancer screening 2018    PCOS (polycystic ovarian syndrome)     RSD (reflex sympathetic dystrophy)     Spinal stenosis        Past Surgical History:   Procedure Laterality Date     SECTION  2019       Family History   Problem Relation Age of Onset    Alcohol abuse Mother     Heart disease Sister     Diabetes Maternal Grandmother     Esophageal cancer Maternal Grandfather     Osteoporosis Paternal Grandmother     Hypertension Paternal Grandmother     Esophageal cancer Paternal Grandfather     Lung cancer Other      I have reviewed and agree with the history as documented.    E-Cigarette/Vaping    E-Cigarette Use Never User      E-Cigarette/Vaping Substances     Social History     Tobacco Use    Smoking status: Some Days     Current packs/day: 0.25     Types: Cigarettes    Smokeless tobacco: Never   Vaping Use    Vaping status: Never Used   Substance Use Topics    Alcohol use: Not Currently     Alcohol/week: 0.0 standard drinks of alcohol     Comment: socially    Drug use: No       Review of Systems   Constitutional:  Negative for chills and fever.   HENT:   Negative for ear pain and sore throat.    Eyes:  Negative for pain and visual disturbance.   Respiratory:  Negative for cough and shortness of breath.    Cardiovascular:  Negative for chest pain and palpitations.   Gastrointestinal:  Negative for abdominal pain and vomiting.   Genitourinary:  Negative for dysuria and hematuria.   Musculoskeletal:  Positive for arthralgias. Negative for back pain.   Skin:  Negative for color change and rash.   Neurological:  Negative for seizures and syncope.   All other systems reviewed and are negative.      Physical Exam  Physical Exam  Vitals reviewed.   Constitutional:       General: She is not in acute distress.     Appearance: She is obese. She is not ill-appearing, toxic-appearing or diaphoretic.   HENT:      Head: Normocephalic.      Mouth/Throat:      Mouth: Mucous membranes are moist.      Pharynx: Oropharynx is clear.   Eyes:      Conjunctiva/sclera: Conjunctivae normal.   Cardiovascular:      Rate and Rhythm: Normal rate.      Pulses: Normal pulses.   Pulmonary:      Effort: Pulmonary effort is normal. No respiratory distress.      Breath sounds: Normal breath sounds. No stridor. No wheezing, rhonchi or rales.   Musculoskeletal:         General: Tenderness present. No deformity or signs of injury.      Cervical back: Normal range of motion and neck supple. No rigidity.      Right lower leg: No edema.      Left lower leg: Swelling, tenderness and bony tenderness present. No deformity or lacerations. No edema.        Feet:       Comments: No deformity, no ecchymosis, no significant swelling, tenderness along the tenderness and forefoot, neurovascularly intact, PT and DP pulses 2+, cap refill <2 sec   Skin:     General: Skin is warm.      Capillary Refill: Capillary refill takes less than 2 seconds.      Coloration: Skin is not jaundiced or pale.      Findings: No bruising, erythema or lesion.   Neurological:      Mental Status: She is alert and oriented to person, place,  and time. Mental status is at baseline.      Sensory: No sensory deficit.         Vital Signs  ED Triage Vitals [07/18/24 0230]   Temperature Pulse Respirations Blood Pressure SpO2   98 °F (36.7 °C) 105 20 (!) 207/102 97 %      Temp src Heart Rate Source Patient Position - Orthostatic VS BP Location FiO2 (%)   -- -- -- -- --      Pain Score       --           Vitals:    07/18/24 0230   BP: (!) 207/102   Pulse: 105         Visual Acuity      ED Medications  Medications - No data to display    Diagnostic Studies  Results Reviewed       None                   XR foot 3+ views LEFT   ED Interpretation by Hai Monique PA-C (07/18 0259)   No acute osseous abnormality                 Procedures  Procedures         ED Course                                               Medical Decision Making  Patient presents with left foot pain. Given history, exam and workup patient likely has chronic pain, arthritis, gout. I have low suspicion for fracture, dislocation, significant ligamentous injury, septic arthritis, gout flare, new autoimmune arthropathy, or gonococcal arthropathy. Discussed gout medication however patient refused at this time. Patient will elevate, rest, ice, ibuprofen and use surgical shoe as needed. Patient will follow up with podiatry. She will follow up with her PCP for elevated blood pressure. Prior to discharge, discharge instructions were discussed with patient at bedside. Patient was provided both verbal and written instructions. Patient is understanding of the discharge instructions and is agreeable to plan of care. Return precautions were discussed with patient bedside, patient verbalized understanding of signs and symptoms that would necessitate return to the ED. All questions were answered. Patient was comfortable with the plan of care and discharged to home.         Problems Addressed:  Foot pain, left: acute illness or injury    Amount and/or Complexity of Data Reviewed  Radiology: ordered and  independent interpretation performed.                 Disposition  Final diagnoses:   Foot pain, left     Time reflects when diagnosis was documented in both MDM as applicable and the Disposition within this note       Time User Action Codes Description Comment    7/18/2024  3:26 AM Hai Monique [M79.672] Foot pain, left     7/18/2024  3:46 AM Hai Monique [I10] Hypertension           ED Disposition       ED Disposition   Discharge    Condition   Stable    Date/Time   Thu Jul 18, 2024  3:26 AM    Comment   Ana Diehl discharge to home/self care.                   Follow-up Information       Follow up With Specialties Details Why Contact Info Additional Information    Saul Sorto MD Internal Medicine Schedule an appointment as soon as possible for a visit   [address]  [address]  [facility] [address]  [phone]       [facility] Emergency Department Emergency Medicine  If symptoms worsen [address] [facility] [id number] [phone] [facility] Emergency Department, [address] [place], [place], [address]    [facility] Schedule an appointment as soon as possible for a visit  As needed [address] [facility] [address]  [phone] [facility] [address] [place], [address]   [phone]            Discharge Medication List as of 7/18/2024  3:29 AM        CONTINUE these medications which have NOT CHANGED    Details   albuterol (2.5 mg/3 mL) 0.083 % nebulizer solution Take 3 mL (2.5 mg total) by nebulization every 6 (six) hours as needed for wheezing or shortness of breath, Starting Fri 1/26/2024, Normal      albuterol (ProAir HFA) 90 mcg/act inhaler Inhale 2 puffs every 6 (six) hours as needed for wheezing, Starting Mon 1/22/2024, Print      amphetamine-dextroamphetamine (ADDERALL) 10 mg tablet TAKE 1 TABLET BY MOUTH DAILY AT 12 PM, [facility]       Ascorbic Acid (VITAMIN C) 100 MG tablet Take 100 mg by mouth daily., Historical Med      Biotin 300 MCG TABS Take by mouth., Historical Med      buPROPion (WELLBUTRIN XL) 150 mg 24 hr tablet Starting Tue 12/14/2021, Historical Med      cetirizine-pseudoephedrine (ZyrTEC-D) 5-120 MG per tablet TAKE 1 TABLET BY MOUTH 2 (TWO) TIMES A DAY FOR 10 DAYS., Historical Med      ibuprofen (MOTRIN) 600 mg tablet Take 1 tablet by mouth every 6 (six) hours as needed for mild pain for up to 5 days, Starting 2/28/2017, Until Sun 3/5/17, Print      lisdexamfetamine (VYVANSE) 70 MG capsule Take 70 mg by mouth every morning, Historical Med      ondansetron (ZOFRAN) 4 mg tablet Take 1 tablet (4 mg total) by mouth every 6 (six) hours, Starting Sun 11/27/2022, Normal      Specialty Vitamins Products (MAGNESIUM, AMINO ACID CHELATE,) 133 MG tablet Take 1 tablet by mouth 2 (two) times a day., Historical Med      labetalol (NORMODYNE) 100 mg tablet Take 1 tablet (100 mg total) by mouth 2 (two) times a day, Starting Fri 1/26/2024, Normal                 PDMP Review       None            ED Provider  Electronically Signed by             Hai Monique PA-C  07/18/24 3288

## 2024-07-18 NOTE — Clinical Note
Ana Diehl was seen and treated in our emergency department on 7/18/2024.                Diagnosis:     Ana  may return to work on return date, is off the rest of the shift today.    She may return on this date: 07/22/2024    Should be excused for 7/17/24-7/22/24. If feeling improved may return sooner     If you have any questions or concerns, please don't hesitate to call.      Hai Monique PA-C    ______________________________           _______________          _______________  Hospital Representative                              Date                                Time

## 2024-07-18 NOTE — DISCHARGE INSTRUCTIONS
Follow-up with PCP and podiatry.  RICE and Tylenol Motrin as needed.  Use hard soled shoe as needed.  Continue to monitor symptoms.  If any symptoms worsen or new symptoms appear return to the ER.

## 2024-07-29 ENCOUNTER — HOSPITAL ENCOUNTER (EMERGENCY)
Facility: HOSPITAL | Age: 44
Discharge: HOME | End: 2024-07-29
Attending: STUDENT IN AN ORGANIZED HEALTH CARE EDUCATION/TRAINING PROGRAM
Payer: COMMERCIAL

## 2024-07-29 VITALS
OXYGEN SATURATION: 96 % | RESPIRATION RATE: 20 BRPM | HEART RATE: 113 BPM | TEMPERATURE: 98.5 F | SYSTOLIC BLOOD PRESSURE: 179 MMHG | DIASTOLIC BLOOD PRESSURE: 100 MMHG

## 2024-07-29 DIAGNOSIS — K04.7 DENTAL ABSCESS: Primary | ICD-10-CM

## 2024-07-29 DIAGNOSIS — R22.0 FACIAL SWELLING: ICD-10-CM

## 2024-07-29 PROCEDURE — 99282 EMERGENCY DEPT VISIT SF MDM: CPT

## 2024-07-29 PROCEDURE — 99284 EMERGENCY DEPT VISIT MOD MDM: CPT

## 2024-07-29 PROCEDURE — 96372 THER/PROPH/DIAG INJ SC/IM: CPT

## 2024-07-29 RX ORDER — OXYCODONE HYDROCHLORIDE 5 MG/1
5 TABLET ORAL EVERY 6 HOURS PRN
Qty: 6 TABLET | Refills: 0 | Status: SHIPPED | OUTPATIENT
Start: 2024-07-29 | End: 2024-08-08

## 2024-07-29 RX ORDER — METRONIDAZOLE 500 MG/1
500 TABLET ORAL ONCE
Status: COMPLETED | OUTPATIENT
Start: 2024-07-29 | End: 2024-07-29

## 2024-07-29 RX ORDER — LEVOFLOXACIN 750 MG/1
750 TABLET, FILM COATED ORAL EVERY 24 HOURS
Qty: 10 TABLET | Refills: 0 | Status: SHIPPED | OUTPATIENT
Start: 2024-07-29 | End: 2024-08-08

## 2024-07-29 RX ORDER — ACETAMINOPHEN 500 MG
1000 TABLET ORAL EVERY 8 HOURS
Qty: 40 TABLET | Refills: 0 | Status: SHIPPED | OUTPATIENT
Start: 2024-07-29

## 2024-07-29 RX ORDER — KETOROLAC TROMETHAMINE 30 MG/ML
15 INJECTION, SOLUTION INTRAMUSCULAR; INTRAVENOUS ONCE
Status: COMPLETED | OUTPATIENT
Start: 2024-07-29 | End: 2024-07-29

## 2024-07-29 RX ORDER — METRONIDAZOLE 500 MG/1
500 TABLET ORAL EVERY 8 HOURS SCHEDULED
Qty: 30 TABLET | Refills: 0 | Status: SHIPPED | OUTPATIENT
Start: 2024-07-29 | End: 2024-08-08

## 2024-07-29 RX ORDER — IBUPROFEN 600 MG/1
600 TABLET ORAL EVERY 6 HOURS PRN
Qty: 30 TABLET | Refills: 0 | Status: SHIPPED | OUTPATIENT
Start: 2024-07-29

## 2024-07-29 RX ORDER — OXYCODONE HYDROCHLORIDE 5 MG/1
5 TABLET ORAL ONCE
Status: COMPLETED | OUTPATIENT
Start: 2024-07-29 | End: 2024-07-29

## 2024-07-29 RX ADMIN — LEVOFLOXACIN 750 MG: 500 TABLET, FILM COATED ORAL at 01:51

## 2024-07-29 RX ADMIN — METRONIDAZOLE 500 MG: 500 TABLET ORAL at 01:44

## 2024-07-29 RX ADMIN — KETOROLAC TROMETHAMINE 15 MG: 30 INJECTION, SOLUTION INTRAMUSCULAR; INTRAVENOUS at 01:45

## 2024-07-29 RX ADMIN — OXYCODONE HYDROCHLORIDE 5 MG: 5 TABLET ORAL at 01:44

## 2024-07-29 NOTE — Clinical Note
Ana Diehl was seen and treated in our emergency department on 7/29/2024.    No restrictions            Diagnosis:     Ana  may return to work on return date.    She may return on this date: 07/31/2024         If you have any questions or concerns, please don't hesitate to call.      Mildred Stewart RN    ______________________________           _______________          _______________  Hospital Representative                              Date                                Time

## 2024-07-29 NOTE — ED PROVIDER NOTES
History  Chief Complaint   Patient presents with    Dental Swelling     Pt arrives ambulatory c/o L side bottom jaw swelling from dental abscess that she woke up with last night.      44-year-old female presents to ED for evaluation of left-sided dental pain.  Patient states that past couple days she has been experiencing left lower dental pain, left-sided facial swelling.  Patient suspects it is secondary to dental infection.  Has previously had dental infections at the site before.  Treated successfully with levofloxacin, metronidazole.  Patient has allergies to many antibiotics so these were used previously.  Patient does not currently have a treating dentist due to insurance difficulties.  Patient denies fever, chills, shortness of breath, tongue swelling, drooling, difficulty swallowing, stridor, wheezing, chest pain.        Prior to Admission Medications   Prescriptions Last Dose Informant Patient Reported? Taking?   Ascorbic Acid (VITAMIN C) 100 MG tablet  Self Yes No   Sig: Take 100 mg by mouth daily.   Biotin 300 MCG TABS  Self Yes No   Sig: Take by mouth.   Specialty Vitamins Products (MAGNESIUM, AMINO ACID CHELATE,) 133 MG tablet  Self Yes No   Sig: Take 1 tablet by mouth 2 (two) times a day.   albuterol (2.5 mg/3 mL) 0.083 % nebulizer solution   No No   Sig: Take 3 mL (2.5 mg total) by nebulization every 6 (six) hours as needed for wheezing or shortness of breath   albuterol (ProAir HFA) 90 mcg/act inhaler   No No   Sig: Inhale 2 puffs every 6 (six) hours as needed for wheezing   amphetamine-dextroamphetamine (ADDERALL) 10 mg tablet   Yes No   Sig: TAKE 1 TABLET BY MOUTH DAILY AT 12 PM   buPROPion (WELLBUTRIN XL) 150 mg 24 hr tablet   Yes No   cetirizine-pseudoephedrine (ZyrTEC-D) 5-120 MG per tablet  Self Yes No   Sig: TAKE 1 TABLET BY MOUTH 2 (TWO) TIMES A DAY FOR 10 DAYS.   ibuprofen (MOTRIN) 600 mg tablet   No No   Sig: Take 1 tablet by mouth every 6 (six) hours as needed for mild pain for up to 5  days   labetalol (NORMODYNE) 100 mg tablet   No No   Sig: Take 1 tablet (100 mg total) by mouth 2 (two) times a day   lisdexamfetamine (VYVANSE) 70 MG capsule   Yes No   Sig: Take 70 mg by mouth every morning   ondansetron (ZOFRAN) 4 mg tablet   No No   Sig: Take 1 tablet (4 mg total) by mouth every 6 (six) hours      Facility-Administered Medications: None       Past Medical History:   Diagnosis Date    ADHD (attention deficit hyperactivity disorder)     Allergic     Arthritis     Asthma     Chronic pain     Clotting disorder (HCC) 2019    Post-partum hemorrhage    Depression 2007    Following spinal injury    Fibroids     Headache(784.0)     Hypertension     Hypothyroidism     Migraine     Obesity     Papanicolaou smear for cervical cancer screening 2018    PCOS (polycystic ovarian syndrome)     RSD (reflex sympathetic dystrophy)     Spinal stenosis        Past Surgical History:   Procedure Laterality Date     SECTION  2019       Family History   Problem Relation Age of Onset    Alcohol abuse Mother     Heart disease Sister     Diabetes Maternal Grandmother     Esophageal cancer Maternal Grandfather     Osteoporosis Paternal Grandmother     Hypertension Paternal Grandmother     Esophageal cancer Paternal Grandfather     Lung cancer Other      I have reviewed and agree with the history as documented.    E-Cigarette/Vaping    E-Cigarette Use Never User      E-Cigarette/Vaping Substances     Social History     Tobacco Use    Smoking status: Some Days     Current packs/day: 0.25     Types: Cigarettes    Smokeless tobacco: Never   Vaping Use    Vaping status: Never Used   Substance Use Topics    Alcohol use: Not Currently     Alcohol/week: 0.0 standard drinks of alcohol     Comment: socially    Drug use: No       Review of Systems   HENT:  Positive for dental problem and facial swelling.    All other systems reviewed and are negative.      Physical Exam  Physical Exam  Vitals and nursing  note reviewed.   Constitutional:       General: She is not in acute distress.     Appearance: Normal appearance. She is well-developed. She is not ill-appearing, toxic-appearing or diaphoretic.   HENT:      Head: Normocephalic and atraumatic.      Mouth/Throat:      Mouth: No angioedema.      Dentition: Abnormal dentition. Dental tenderness, gingival swelling and dental caries present. No dental abscesses.        Comments: Tender to palpation of left lower premolar, molar.  Well progress dental cavities seen.  No drainable dental abscesses seen.  Left-sided facial swelling noted.  No evidence of Ludewig's angina.  No angioedema.  Eyes:      General: No scleral icterus.        Right eye: No discharge.         Left eye: No discharge.      Extraocular Movements: Extraocular movements intact.      Conjunctiva/sclera: Conjunctivae normal.      Pupils: Pupils are equal, round, and reactive to light.   Cardiovascular:      Rate and Rhythm: Normal rate and regular rhythm.      Pulses: Normal pulses.      Heart sounds: Normal heart sounds. No murmur heard.     No friction rub. No gallop.   Pulmonary:      Effort: Pulmonary effort is normal. No respiratory distress.      Breath sounds: Normal breath sounds. No stridor. No wheezing, rhonchi or rales.   Musculoskeletal:         General: No swelling.      Cervical back: Neck supple.   Skin:     General: Skin is warm and dry.      Capillary Refill: Capillary refill takes less than 2 seconds.      Coloration: Skin is not jaundiced or pale.      Findings: No rash.   Neurological:      Mental Status: She is alert.   Psychiatric:         Mood and Affect: Mood normal.         Vital Signs  ED Triage Vitals [07/29/24 0053]   Temperature Pulse Respirations Blood Pressure SpO2   98.5 °F (36.9 °C) (!) 113 20 (!) 179/100 96 %      Temp Source Heart Rate Source Patient Position - Orthostatic VS BP Location FiO2 (%)   Oral Monitor Sitting Left arm --      Pain Score       --           Vitals:     07/29/24 0053   BP: (!) 179/100   Pulse: (!) 113   Patient Position - Orthostatic VS: Sitting         Visual Acuity      ED Medications  Medications   levofloxacin (LEVAQUIN) tablet 750 mg (750 mg Oral Given 7/29/24 0151)   metroNIDAZOLE (FLAGYL) tablet 500 mg (500 mg Oral Given 7/29/24 0144)   oxyCODONE (ROXICODONE) IR tablet 5 mg (5 mg Oral Given 7/29/24 0144)   ketorolac (TORADOL) injection 15 mg (15 mg Intramuscular Given 7/29/24 0145)       Diagnostic Studies  Results Reviewed       None                   No orders to display              Procedures  Procedures         ED Course                                 SBIRT 20yo+      Flowsheet Row Most Recent Value   Initial Alcohol Screen: US AUDIT-C     1. How often do you have a drink containing alcohol? 0 Filed at: 07/29/2024 0055   2. How many drinks containing alcohol do you have on a typical day you are drinking?  0 Filed at: 07/29/2024 0055   3b. FEMALE Any Age, or MALE 65+: How often do you have 4 or more drinks on one occassion? 0 Filed at: 07/29/2024 0055   Audit-C Score 0 Filed at: 07/29/2024 0055   SURENDRA: How many times in the past year have you...    Used an illegal drug or used a prescription medication for non-medical reasons? Never Filed at: 07/29/2024 0055                      Medical Decision Making  44-year-old female presents to ED for evaluation of left lower dental pain, left-sided facial swelling as above.  On physical examination patient afebrile.  Tachycardic at 113 bpm.  Suspect secondary to pain.  Left-sided facial swelling along site of dental pain.  No drainable dental abscesses seen.  No Ludewig's angina.  No angioedema.  No stridor.  No evidence of airway occlusion.  No murmur.  Normal breath sounds.  Offered patient CT scan to determine depth of suspected dental infection.  Patient would prefer trying oral antibiotic.  Plan to provide patient with levofloxacin, metronidazole prescription.  First dose in ED.  Provided pain control  with oxycodone, Toradol.  Provided prescription for metronidazole, levofloxacin, Tylenol.  Small amount of oxycodone for severe pain.  [facility].  Advised that patient will need to follow-up with a dentist for definitive management as her teeth will likely continue to develop infections if not cared for.  Advised patient that if the pain, swelling does not improve within 48 hours despite use of levofloxacin she should return to ED for further evaluation.  Should also return for new or worsening symptoms.  Patient expresses understanding of this.  Patient discharged.    Prior to discharge, discharge instructions were discussed with patient at bedside. Patient was provided both verbal and written instructions. Patient is understanding of the discharge instructions and is agreeable to plan of care. Return precautions were discussed with patient bedside, patient verbalized understanding of signs and symptoms that would necessitate return to the ED. All questions were answered. Patient was comfortable with the plan of care and discharged to home.     Risk  OTC drugs.  Prescription drug management.                 Disposition  Final diagnoses:   Dental abscess   Facial swelling     Time reflects when diagnosis was documented in both MDM as applicable and the Disposition within this note       Time User Action Codes Description Comment    7/29/2024  1:40 AM Chris Ludwig Add [K04.7] Dental abscess     7/29/2024  1:40 AM Chris Ludwig Add [R22.0] Facial swelling           ED Disposition       ED Disposition   Discharge    Condition   Stable    Date/Time   Mon Jul 29, 2024 0140    Comment   Ana Diehl discharge to home/self care.                   Follow-up Information       Follow up With Specialties Details Why Contact Info    [facility] [place] [id number] [phone]            Discharge Medication  List as of 7/29/2024  1:49 AM        START taking these medications    Details   acetaminophen (TYLENOL) 500 mg tablet Take 2 tablets (1,000 mg total) by mouth every 8 (eight) hours, Starting Mon 7/29/2024, Normal      levofloxacin (LEVAQUIN) 750 mg tablet Take 1 tablet (750 mg total) by mouth every 24 hours for 10 days, Starting Mon 7/29/2024, Until Thu 8/8/2024, Normal      metroNIDAZOLE (FLAGYL) 500 mg tablet Take 1 tablet (500 mg total) by mouth every 8 (eight) hours for 10 days, Starting Mon 7/29/2024, Until Thu 8/8/2024, Normal      oxyCODONE (Roxicodone) 5 immediate release tablet Take 1 tablet (5 mg total) by mouth every 6 (six) hours as needed for moderate pain or severe pain for up to 10 days Max Daily Amount: 20 mg, Starting Mon 7/29/2024, Until Thu 8/8/2024 at 2359, Normal           CONTINUE these medications which have CHANGED    Details   ibuprofen (MOTRIN) 600 mg tablet Take 1 tablet (600 mg total) by mouth every 6 (six) hours as needed for mild pain or moderate pain, Starting Mon 7/29/2024, Normal           CONTINUE these medications which have NOT CHANGED    Details   albuterol (2.5 mg/3 mL) 0.083 % nebulizer solution Take 3 mL (2.5 mg total) by nebulization every 6 (six) hours as needed for wheezing or shortness of breath, Starting Fri 1/26/2024, Normal      albuterol (ProAir HFA) 90 mcg/act inhaler Inhale 2 puffs every 6 (six) hours as needed for wheezing, Starting Mon 1/22/2024, Print      amphetamine-dextroamphetamine (ADDERALL) 10 mg tablet TAKE 1 TABLET BY MOUTH DAILY AT 12 PM, Historical Med      Ascorbic Acid (VITAMIN C) 100 MG tablet Take 100 mg by mouth daily., Historical Med      Biotin 300 MCG TABS Take by mouth., Historical Med      buPROPion (WELLBUTRIN XL) 150 mg 24 hr tablet Starting Tue 12/14/2021, Historical Med      cetirizine-pseudoephedrine (ZyrTEC-D) 5-120 MG per tablet TAKE 1 TABLET BY MOUTH 2 (TWO) TIMES A DAY FOR 10 DAYS., Historical Med      labetalol (NORMODYNE) 100 mg  tablet Take 1 tablet (100 mg total) by mouth 2 (two) times a day, Starting Thu 7/18/2024, Normal      lisdexamfetamine (VYVANSE) 70 MG capsule Take 70 mg by mouth every morning, Historical Med      ondansetron (ZOFRAN) 4 mg tablet Take 1 tablet (4 mg total) by mouth every 6 (six) hours, Starting Sun 11/27/2022, Normal      Specialty Vitamins Products (MAGNESIUM, AMINO ACID CHELATE,) 133 MG tablet Take 1 tablet by mouth 2 (two) times a day., Historical Med                 PDMP Review       None            ED Provider  Electronically Signed by             Chris Ludwig PA-C  07/29/24 1186

## 2024-07-29 NOTE — DISCHARGE INSTRUCTIONS
Today I provided prescription for Tylenol, ibuprofen, oxycodone, Levaquin, Flagyl.  The Tylenol, ibuprofen, oxycodone is for pain control.  Take as directed.  The Levaquin, Flagyl is for dental infection.  I provided referral to oral maxillofacial surgery in [place].  Contact the phone number above to make an appointment.  Please do return to ED for any new or worsening symptoms despite taking the antibiotic.  If symptoms do not improve within 48 hours, return to ED.

## 2024-08-02 ENCOUNTER — HOSPITAL ENCOUNTER (EMERGENCY)
Facility: HOSPITAL | Age: 44
Discharge: HOME | End: 2024-08-02
Attending: EMERGENCY MEDICINE
Payer: COMMERCIAL

## 2024-08-02 ENCOUNTER — APPOINTMENT (EMERGENCY)
Dept: CT IMAGING | Facility: HOSPITAL | Age: 44
End: 2024-08-02
Payer: COMMERCIAL

## 2024-08-02 VITALS
TEMPERATURE: 98 F | OXYGEN SATURATION: 98 % | HEIGHT: 64 IN | WEIGHT: 257.28 LBS | DIASTOLIC BLOOD PRESSURE: 92 MMHG | HEART RATE: 87 BPM | SYSTOLIC BLOOD PRESSURE: 165 MMHG | RESPIRATION RATE: 19 BRPM | BODY MASS INDEX: 43.92 KG/M2

## 2024-08-02 DIAGNOSIS — K02.9 DENTAL CARIES: ICD-10-CM

## 2024-08-02 DIAGNOSIS — K08.89 PAIN, DENTAL: Primary | ICD-10-CM

## 2024-08-02 LAB
ALBUMIN SERPL BCG-MCNC: 3.8 G/DL (ref 3.5–5)
ALP SERPL-CCNC: 119 U/L (ref 34–104)
ALT SERPL W P-5'-P-CCNC: 25 U/L (ref 7–52)
ANION GAP SERPL CALCULATED.3IONS-SCNC: 7 MMOL/L (ref 4–13)
AST SERPL W P-5'-P-CCNC: 16 U/L (ref 13–39)
BASOPHILS # BLD AUTO: 0.04 THOUSANDS/ÂΜL (ref 0–0.1)
BASOPHILS NFR BLD AUTO: 0 % (ref 0–1)
BILIRUB SERPL-MCNC: 0.21 MG/DL (ref 0.2–1)
BUN SERPL-MCNC: 13 MG/DL (ref 5–25)
CALCIUM SERPL-MCNC: 9.1 MG/DL (ref 8.4–10.2)
CHLORIDE SERPL-SCNC: 106 MMOL/L (ref 96–108)
CO2 SERPL-SCNC: 24 MMOL/L (ref 21–32)
CREAT SERPL-MCNC: 0.66 MG/DL (ref 0.6–1.3)
EOSINOPHIL # BLD AUTO: 0.25 THOUSAND/ÂΜL (ref 0–0.61)
EOSINOPHIL NFR BLD AUTO: 3 % (ref 0–6)
ERYTHROCYTE [DISTWIDTH] IN BLOOD BY AUTOMATED COUNT: 15.2 % (ref 11.6–15.1)
EXT PREGNANCY TEST URINE: NEGATIVE
EXT. CONTROL: NORMAL
GFR SERPL CREATININE-BSD FRML MDRD: 109 ML/MIN/1.73SQ M
GLUCOSE SERPL-MCNC: 147 MG/DL (ref 65–140)
HCT VFR BLD AUTO: 34.7 % (ref 34.8–46.1)
HGB BLD-MCNC: 11.1 G/DL (ref 11.5–15.4)
IMM GRANULOCYTES # BLD AUTO: 0.03 THOUSAND/UL (ref 0–0.2)
IMM GRANULOCYTES NFR BLD AUTO: 0 % (ref 0–2)
LYMPHOCYTES # BLD AUTO: 1.82 THOUSANDS/ÂΜL (ref 0.6–4.47)
LYMPHOCYTES NFR BLD AUTO: 20 % (ref 14–44)
MCH RBC QN AUTO: 27.1 PG (ref 26.8–34.3)
MCHC RBC AUTO-ENTMCNC: 32 G/DL (ref 31.4–37.4)
MCV RBC AUTO: 85 FL (ref 82–98)
MONOCYTES # BLD AUTO: 0.72 THOUSAND/ÂΜL (ref 0.17–1.22)
MONOCYTES NFR BLD AUTO: 8 % (ref 4–12)
NEUTROPHILS # BLD AUTO: 6.17 THOUSANDS/ÂΜL (ref 1.85–7.62)
NEUTS SEG NFR BLD AUTO: 69 % (ref 43–75)
NRBC BLD AUTO-RTO: 0 /100 WBCS
PLATELET # BLD AUTO: 365 THOUSANDS/UL (ref 149–390)
PMV BLD AUTO: 9.3 FL (ref 8.9–12.7)
POTASSIUM SERPL-SCNC: 3.7 MMOL/L (ref 3.5–5.3)
PROT SERPL-MCNC: 7.1 G/DL (ref 6.4–8.4)
RBC # BLD AUTO: 4.1 MILLION/UL (ref 3.81–5.12)
SODIUM SERPL-SCNC: 137 MMOL/L (ref 135–147)
WBC # BLD AUTO: 9.03 THOUSAND/UL (ref 4.31–10.16)

## 2024-08-02 PROCEDURE — 85025 COMPLETE CBC W/AUTO DIFF WBC: CPT

## 2024-08-02 PROCEDURE — 70491 CT SOFT TISSUE NECK W/DYE: CPT

## 2024-08-02 PROCEDURE — 36415 COLL VENOUS BLD VENIPUNCTURE: CPT

## 2024-08-02 PROCEDURE — 96375 TX/PRO/DX INJ NEW DRUG ADDON: CPT

## 2024-08-02 PROCEDURE — 99283 EMERGENCY DEPT VISIT LOW MDM: CPT

## 2024-08-02 PROCEDURE — 99284 EMERGENCY DEPT VISIT MOD MDM: CPT

## 2024-08-02 PROCEDURE — 96365 THER/PROPH/DIAG IV INF INIT: CPT

## 2024-08-02 PROCEDURE — 81025 URINE PREGNANCY TEST: CPT

## 2024-08-02 PROCEDURE — 80053 COMPREHEN METABOLIC PANEL: CPT

## 2024-08-02 RX ORDER — KETOROLAC TROMETHAMINE 30 MG/ML
15 INJECTION, SOLUTION INTRAMUSCULAR; INTRAVENOUS ONCE
Status: COMPLETED | OUTPATIENT
Start: 2024-08-02 | End: 2024-08-02

## 2024-08-02 RX ORDER — DIPHENHYDRAMINE HYDROCHLORIDE AND LIDOCAINE HYDROCHLORIDE AND ALUMINUM HYDROXIDE AND MAGNESIUM HYDRO
10 KIT EVERY 4 HOURS PRN
Qty: 119 ML | Refills: 0 | Status: SHIPPED | OUTPATIENT
Start: 2024-08-02

## 2024-08-02 RX ORDER — ACETAMINOPHEN 10 MG/ML
1000 INJECTION, SOLUTION INTRAVENOUS ONCE
Status: COMPLETED | OUTPATIENT
Start: 2024-08-02 | End: 2024-08-02

## 2024-08-02 RX ORDER — CHLORHEXIDINE GLUCONATE ORAL RINSE 1.2 MG/ML
15 SOLUTION DENTAL 2 TIMES DAILY
Qty: 120 ML | Refills: 0 | Status: SHIPPED | OUTPATIENT
Start: 2024-08-02

## 2024-08-02 RX ORDER — DIPHENHYDRAMINE HYDROCHLORIDE AND LIDOCAINE HYDROCHLORIDE AND ALUMINUM HYDROXIDE AND MAGNESIUM HYDRO
10 KIT ONCE
Status: COMPLETED | OUTPATIENT
Start: 2024-08-02 | End: 2024-08-02

## 2024-08-02 RX ADMIN — KETOROLAC TROMETHAMINE 15 MG: 30 INJECTION, SOLUTION INTRAMUSCULAR at 01:35

## 2024-08-02 RX ADMIN — LIDOCAINE HYDROCHLORIDE 10 ML: 20 SOLUTION ORAL; TOPICAL at 04:13

## 2024-08-02 RX ADMIN — ACETAMINOPHEN 1000 MG: 10 INJECTION INTRAVENOUS at 04:13

## 2024-08-02 RX ADMIN — IOHEXOL 85 ML: 350 INJECTION, SOLUTION INTRAVENOUS at 02:11

## 2024-08-02 NOTE — Clinical Note
Ana Diehl was seen and treated in our emergency department on 8/2/2024.                Diagnosis:     Ana  may return to work on return date, is off the rest of the shift today.    She may return on this date: 08/09/2024    If feeling better may return sooner. Should be excused from 7/31/24 to 8/9/24     If you have any questions or concerns, please don't hesitate to call.      Hai Monique PA-C    ______________________________           _______________          _______________  Hospital Representative                              Date                                Time

## 2024-08-02 NOTE — ED PROVIDER NOTES
History  Chief Complaint   Patient presents with    Dental Pain     Pt reports was seen here on Sunday for dental abscess was placed on Flagyl and Levaquin. Pt reports pain and swelling worse today. Reports body aches and fatigue. Denies fevers. Denies meds PTA. Was instructed by PCP to come to ED for CT scan      The patient is a 44 y.o. female who presents to [facility] with a chief complaint of dental pain. Symptoms began last weekend and have been about the same since onset. Her pain is currently rated as a 6/10 in severity and described as sharp without radiation. Associated symptoms include left sided facial swelling. Symptoms are aggravated with touching and hot/cold and alleviating factors include none noted. The patient denies fever, chills, night sweats, chest pain, shortness of breath, cough, sputum, wheezing, dizziness, headache, loss of consciousness, falls, trauma, syncope. No other reported symptoms at this time.  Patient reports being seen here on Sunday and started on levaquin and flagyl without much improvement            History provided by:  Patient   used: No    Dental Pain  Associated symptoms: no fever        Prior to Admission Medications   Prescriptions Last Dose Informant Patient Reported? Taking?   Ascorbic Acid (VITAMIN C) 100 MG tablet  Self Yes No   Sig: Take 100 mg by mouth daily.   Biotin 300 MCG TABS  Self Yes No   Sig: Take by mouth.   Specialty Vitamins Products (MAGNESIUM, AMINO ACID CHELATE,) 133 MG tablet  Self Yes No   Sig: Take 1 tablet by mouth 2 (two) times a day.   acetaminophen (TYLENOL) 500 mg tablet   No No   Sig: Take 2 tablets (1,000 mg total) by mouth every 8 (eight) hours   albuterol (2.5 mg/3 mL) 0.083 % nebulizer solution   No No   Sig: Take 3 mL (2.5 mg total) by nebulization every 6 (six) hours as needed for wheezing or shortness of breath   albuterol (ProAir HFA) 90 mcg/act inhaler   No No   Sig: Inhale 2 puffs every 6 (six)  hours as needed for wheezing   amphetamine-dextroamphetamine (ADDERALL) 10 mg tablet   Yes No   Sig: TAKE 1 TABLET BY MOUTH DAILY AT 12 PM   buPROPion (WELLBUTRIN XL) 150 mg 24 hr tablet   Yes No   cetirizine-pseudoephedrine (ZyrTEC-D) 5-120 MG per tablet  Self Yes No   Sig: TAKE 1 TABLET BY MOUTH 2 (TWO) TIMES A DAY FOR 10 DAYS.   ibuprofen (MOTRIN) 600 mg tablet   No No   Sig: Take 1 tablet (600 mg total) by mouth every 6 (six) hours as needed for mild pain or moderate pain   labetalol (NORMODYNE) 100 mg tablet   No No   Sig: Take 1 tablet (100 mg total) by mouth 2 (two) times a day   levofloxacin (LEVAQUIN) 750 mg tablet   No No   Sig: Take 1 tablet (750 mg total) by mouth every 24 hours for 10 days   lisdexamfetamine (VYVANSE) 70 MG capsule   Yes No   Sig: Take 70 mg by mouth every morning   metroNIDAZOLE (FLAGYL) 500 mg tablet   No No   Sig: Take 1 tablet (500 mg total) by mouth every 8 (eight) hours for 10 days   ondansetron (ZOFRAN) 4 mg tablet   No No   Sig: Take 1 tablet (4 mg total) by mouth every 6 (six) hours   oxyCODONE (Roxicodone) 5 immediate release tablet   No No   Sig: Take 1 tablet (5 mg total) by mouth every 6 (six) hours as needed for moderate pain or severe pain for up to 10 days Max Daily Amount: 20 mg      Facility-Administered Medications: None       Past Medical History:   Diagnosis Date    ADHD (attention deficit hyperactivity disorder)     Allergic     Arthritis     Asthma     Chronic pain     Clotting disorder (HCC) 2019    Post-partum hemorrhage    Depression 2007    Following spinal injury    Fibroids     Headache(784.0)     Hypertension     Hypothyroidism     Migraine     Obesity     Papanicolaou smear for cervical cancer screening 2018    PCOS (polycystic ovarian syndrome)     RSD (reflex sympathetic dystrophy)     Spinal stenosis        Past Surgical History:   Procedure Laterality Date     SECTION  2019       Family History   Problem Relation Age of  Onset    Alcohol abuse Mother     Heart disease Sister     Diabetes Maternal Grandmother     Esophageal cancer Maternal Grandfather     Osteoporosis Paternal Grandmother     Hypertension Paternal Grandmother     Esophageal cancer Paternal Grandfather     Lung cancer Other      I have reviewed and agree with the history as documented.    E-Cigarette/Vaping    E-Cigarette Use Never User      E-Cigarette/Vaping Substances    Nicotine No     THC No     CBD No     Flavoring No     Other No     Unknown No      Social History     Tobacco Use    Smoking status: Some Days     Current packs/day: 0.25     Types: Cigarettes    Smokeless tobacco: Never   Vaping Use    Vaping status: Never Used   Substance Use Topics    Alcohol use: Not Currently     Alcohol/week: 0.0 standard drinks of alcohol     Comment: socially    Drug use: No       Review of Systems   Constitutional:  Negative for chills and fever.   HENT:  Positive for dental problem. Negative for ear pain and sore throat.    Eyes:  Negative for pain and visual disturbance.   Respiratory:  Negative for cough and shortness of breath.    Cardiovascular:  Negative for chest pain and palpitations.   Gastrointestinal:  Negative for abdominal pain and vomiting.   Genitourinary:  Negative for dysuria and hematuria.   Musculoskeletal:  Negative for arthralgias and back pain.   Skin:  Negative for color change and rash.   Neurological:  Negative for seizures and syncope.   All other systems reviewed and are negative.      Physical Exam  Physical Exam  Vitals reviewed.   Constitutional:       General: She is not in acute distress.     Appearance: Normal appearance. She is not ill-appearing or diaphoretic.   HENT:      Head: Normocephalic.      Right Ear: Tympanic membrane, ear canal and external ear normal.      Left Ear: Tympanic membrane, ear canal and external ear normal.      Nose: Nose normal.      Mouth/Throat:      Mouth: Mucous membranes are moist.      Dentition: Abnormal  dentition. Dental tenderness, gingival swelling and dental caries present. No dental abscesses.      Pharynx: Oropharynx is clear. Uvula midline. No pharyngeal swelling, oropharyngeal exudate, posterior oropharyngeal erythema or uvula swelling.      Tonsils: No tonsillar exudate or tonsillar abscesses.        Comments: Patient missing teeth along the bottom left where she is tender, no PTA, dental abscess, signs of mikal angina or trismus  Cardiovascular:      Rate and Rhythm: Normal rate.      Pulses: Normal pulses.   Pulmonary:      Effort: Pulmonary effort is normal. No respiratory distress.      Breath sounds: Normal breath sounds. No stridor. No wheezing or rhonchi.   Abdominal:      General: Abdomen is flat. Bowel sounds are normal.      Palpations: Abdomen is soft.      Tenderness: There is no abdominal tenderness.   Musculoskeletal:         General: Normal range of motion.      Cervical back: Normal range of motion and neck supple.   Skin:     General: Skin is warm and dry.      Capillary Refill: Capillary refill takes less than 2 seconds.      Coloration: Skin is not jaundiced.      Findings: No bruising.   Neurological:      Mental Status: She is alert and oriented to person, place, and time. Mental status is at baseline.         Vital Signs  ED Triage Vitals [08/02/24 0051]   Temperature Pulse Respirations Blood Pressure SpO2   98 °F (36.7 °C) (!) 108 18 (!) 183/97 98 %      Temp Source Heart Rate Source Patient Position - Orthostatic VS BP Location FiO2 (%)   Temporal Monitor Sitting Left arm --      Pain Score       --           Vitals:    08/02/24 0051 08/02/24 0413   BP: (!) 183/97 165/92   Pulse: (!) 108 87   Patient Position - Orthostatic VS: Sitting          Visual Acuity      ED Medications  Medications   ketorolac (TORADOL) injection 15 mg (15 mg Intravenous Given 8/2/24 0135)   iohexol (OMNIPAQUE) 350 MG/ML injection (MULTI-DOSE) 85 mL (85 mL Intravenous Given 8/2/24 0211)   diphenhydramine,  lidocaine, Al/Mg hydroxide, simethicone (Magic Mouthwash) oral solution 10 mL (10 mL Swish & Spit Given 8/2/24 7963)   acetaminophen (Ofirmev) injection 1,000 mg (0 mg Intravenous Stopped 8/2/24 4638)       Diagnostic Studies  Results Reviewed       Procedure Component Value Units Date/Time    Comprehensive metabolic panel [[id number]]  (Abnormal) Collected: 08/02/24 0132    Lab Status: Final result Specimen: Blood from Arm, Left Updated: 08/02/24 0157     Sodium 137 mmol/L      Potassium 3.7 mmol/L      Chloride 106 mmol/L      CO2 24 mmol/L      ANION GAP 7 mmol/L      BUN 13 mg/dL      Creatinine 0.66 mg/dL      Glucose 147 mg/dL      Calcium 9.1 mg/dL      AST 16 U/L      ALT 25 U/L      Alkaline Phosphatase 119 U/L      Total Protein 7.1 g/dL      Albumin 3.8 g/dL      Total Bilirubin 0.21 mg/dL      eGFR 109 ml/min/1.73sq m     Narrative:      National Kidney Disease Foundation guidelines for Chronic Kidney Disease (CKD):     Stage 1 with normal or high GFR (GFR > 90 mL/min/1.73 square meters)    Stage 2 Mild CKD (GFR = 60-89 mL/min/1.73 square meters)    Stage 3A Moderate CKD (GFR = 45-59 mL/min/1.73 square meters)    Stage 3B Moderate CKD (GFR = 30-44 mL/min/1.73 square meters)    Stage 4 Severe CKD (GFR = 15-29 mL/min/1.73 square meters)    Stage 5 End Stage CKD (GFR <15 mL/min/1.73 square meters)  Note: GFR calculation is accurate only with a steady state creatinine    CBC and differential [[id number]]  (Abnormal) Collected: 08/02/24 0132    Lab Status: Final result Specimen: Blood from Arm, Left Updated: 08/02/24 0137     WBC 9.03 Thousand/uL      RBC 4.10 Million/uL      Hemoglobin 11.1 g/dL      Hematocrit 34.7 %      MCV 85 fL      MCH 27.1 pg      MCHC 32.0 g/dL      RDW 15.2 %      MPV 9.3 fL      Platelets 365 Thousands/uL      nRBC 0 /100 WBCs      Segmented % 69 %      Immature Grans % 0 %      Lymphocytes % 20 %      Monocytes % 8 %      Eosinophils Relative 3 %      Basophils Relative 0 %       Absolute Neutrophils 6.17 Thousands/µL      Absolute Immature Grans 0.03 Thousand/uL      Absolute Lymphocytes 1.82 Thousands/µL      Absolute Monocytes 0.72 Thousand/µL      Eosinophils Absolute 0.25 Thousand/µL      Basophils Absolute 0.04 Thousands/µL     POCT pregnancy, urine [[id number]]  (Normal) Resulted: 08/02/24 0133    Lab Status: Final result Updated: 08/02/24 0133     EXT Preg Test, Ur Negative     Control Valid                   CT soft tissue neck with contrast   Final Result by Hernesto Kan MD (08/02 0418)      No acute cervical soft tissue abnormality identified. Specifically no significant soft tissue cellulitic changes or discrete enhancing collections.      Scattered suspected dental carious lesions and periapical lucencies compatible with periodontal disease. No definite odontogenic abscess seen within limitation of artifact. Clinical correlation and dental follow-up recommended.      Workstation performed: [id number]                    Procedures  Procedures         ED Course  ED Course as of 08/02/24 0513   Fri Aug 02, 2024   0143 PREGNANCY TEST URINE: Negative   0424 CT soft tissue neck with contrast  No acute cervical soft tissue abnormality identified. Specifically no significant soft tissue cellulitic changes or discrete enhancing collections.     Scattered suspected dental carious lesions and periapical lucencies compatible with periodontal disease. No definite odontogenic abscess seen within limitation of artifact. Clinical correlation and dental follow-up recommended.                                   SBIRT 22yo+      Flowsheet Row Most Recent Value   Initial Alcohol Screen: US AUDIT-C     1. How often do you have a drink containing alcohol? 0 Filed at: 08/02/2024 0100   2. How many drinks containing alcohol do you have on a typical day you are drinking?  0 Filed at: 08/02/2024 0100   3a. Male UNDER 65: How often do you have five or more drinks on one occasion? 0 Filed at: 08/02/2024 0100    3b. FEMALE Any Age, or MALE 65+: How often do you have 4 or more drinks on one occassion? 0 Filed at: 08/02/2024 0100   Audit-C Score 0 Filed at: 08/02/2024 0100   SURENDRA: How many times in the past year have you...    Used an illegal drug or used a prescription medication for non-medical reasons? Never Filed at: 08/02/2024 0100                      Medical Decision Making  Patient presents for dental pain due to suspected dental jihan. Patient not immunosuppressed, afebrile and well appearing with patent airway, have low suspicfion for deep space infection or any concern for airway compromise. Based on history, physical, and work up. No evidence of tooth fracture, avulsion, or bleeding socket. No evidence of Retropharyngeal abscess, Peritonsillar abscess, Ger's angina, periapical abscess. Patient has had slight improvement on abx and PCP wanted CT scan. Ct soft tissue neck showed No acute cervical soft tissue abnormality identified. Specifically no significant soft tissue cellulitic changes or discrete enhancing collections.  Scattered suspected dental carious lesions and periapical lucencies compatible with periodontal disease. No definite odontogenic abscess seen within limitation of artifact. Clinical correlation and dental follow-up recommended. Shared decision making had with the patient to switch abx or continue her current regimen. Patient would like to continue her current regimen. Patient will schedule appt with [facility] as they have called her and stated they will see her ASAP. Discussed strict return parameters. Prior to discharge, discharge instructions were discussed with patient at bedside. Patient was provided both verbal and written instructions. Patient is understanding of the discharge instructions and is agreeable to plan of care. Return precautions were discussed with patient bedside, patient verbalized understanding of signs and symptoms that would necessitate return to the ED. All questions were  answered. Patient was comfortable with the plan of care and discharged to home.       Problems Addressed:  Dental caries: acute illness or injury  Pain, dental: acute illness or injury    Amount and/or Complexity of Data Reviewed  Labs: ordered. Decision-making details documented in ED Course.  Radiology: ordered.    Risk  Prescription drug management.                 Disposition  Final diagnoses:   Pain, dental   Dental caries     Time reflects when diagnosis was documented in both MDM as applicable and the Disposition within this note       Time User Action Codes Description Comment    8/2/2024  4:56 AM Hai Monique Add [K08.89] Pain, dental     8/2/2024  4:56 AM Hai Monique Add [K02.9] Dental caries           ED Disposition       ED Disposition   Discharge    Condition   Stable    Date/Time   Fri Aug 2, 2024 0456    Comment   Ana Diehl discharge to home/self care.                   Follow-up Information       Follow up With Specialties Details Why Contact Info Additional Information    Saul Sorto MD Internal Medicine Schedule an appointment as soon as possible for a visit   [address]  [facility] [address]  [facility] [address]  [phone]       [facility] Emergency Medicine  If symptoms worsen [address] [facility] [id number] [phone] [facility], [address], [place], [address]            Discharge Medication List as of 8/2/2024  4:57 AM        START taking these medications    Details   diphenhydramine, lidocaine, Al/Mg hydroxide, simethicone (Magic Mouthwash) SUSP Swish and spit 10 mL every 4 (four) hours as needed for mouth pain or discomfort, Starting Fri 8/2/2024, Normal           CONTINUE these medications which have NOT CHANGED    Details   acetaminophen (TYLENOL) 500 mg tablet Take 2 tablets (1,000 mg total) by mouth every 8 (eight) hours, Starting Mon 7/29/2024, Normal       albuterol (2.5 mg/3 mL) 0.083 % nebulizer solution Take 3 mL (2.5 mg total) by nebulization every 6 (six) hours as needed for wheezing or shortness of breath, Starting Fri 1/26/2024, Normal      albuterol (ProAir HFA) 90 mcg/act inhaler Inhale 2 puffs every 6 (six) hours as needed for wheezing, Starting Mon 1/22/2024, Print      amphetamine-dextroamphetamine (ADDERALL) 10 mg tablet TAKE 1 TABLET BY MOUTH DAILY AT 12 PM, Historical Med      Ascorbic Acid (VITAMIN C) 100 MG tablet Take 100 mg by mouth daily., Historical Med      Biotin 300 MCG TABS Take by mouth., Historical Med      buPROPion (WELLBUTRIN XL) 150 mg 24 hr tablet Starting Tue 12/14/2021, Historical Med      cetirizine-pseudoephedrine (ZyrTEC-D) 5-120 MG per tablet TAKE 1 TABLET BY MOUTH 2 (TWO) TIMES A DAY FOR 10 DAYS., Historical Med      ibuprofen (MOTRIN) 600 mg tablet Take 1 tablet (600 mg total) by mouth every 6 (six) hours as needed for mild pain or moderate pain, Starting Mon 7/29/2024, Normal      labetalol (NORMODYNE) 100 mg tablet Take 1 tablet (100 mg total) by mouth 2 (two) times a day, Starting Thu 7/18/2024, Normal      levofloxacin (LEVAQUIN) 750 mg tablet Take 1 tablet (750 mg total) by mouth every 24 hours for 10 days, Starting Mon 7/29/2024, Until Thu 8/8/2024, Normal      lisdexamfetamine (VYVANSE) 70 MG capsule Take 70 mg by mouth every morning, Historical Med      metroNIDAZOLE (FLAGYL) 500 mg tablet Take 1 tablet (500 mg total) by mouth every 8 (eight) hours for 10 days, Starting Mon 7/29/2024, Until Thu 8/8/2024, Normal      ondansetron (ZOFRAN) 4 mg tablet Take 1 tablet (4 mg total) by mouth every 6 (six) hours, Starting Sun 11/27/2022, Normal      oxyCODONE (Roxicodone) 5 immediate release tablet Take 1 tablet (5 mg total) by mouth every 6 (six) hours as needed for moderate pain or severe pain for up to 10 days Max Daily Amount: 20 mg, Starting Mon 7/29/2024, Until u 8/8/2024 at 2359, Normal      Specialty Vitamins Products  (MAGNESIUM, AMINO ACID CHELATE,) 133 MG tablet Take 1 tablet by mouth 2 (two) times a day., Historical Med                 PDMP Review       None            ED Provider  Electronically Signed by             Hai Monique PA-C  08/02/24 0547

## 2024-08-02 NOTE — DISCHARGE INSTRUCTIONS
Follow-up with PCP and [facility].  Take your medicine as it is prescribed.  Continue to monitor symptoms at home.  If any symptoms worsen or new symptoms appear return to the ER.

## 2025-03-07 ENCOUNTER — VBI (OUTPATIENT)
Dept: ADMINISTRATIVE | Facility: OTHER | Age: 45
End: 2025-03-07

## 2025-03-07 NOTE — TELEPHONE ENCOUNTER
03/07/25 5:56 PM     Chart reviewed for Pap Smear (HPV) aka Cervical Cancer Screening ; nothing is submitted to the patient's insurance at this time.     Dora Ling   [facility]

## 2025-06-02 ENCOUNTER — OFFICE VISIT (OUTPATIENT)
Age: 45
End: 2025-06-02
Payer: COMMERCIAL

## 2025-06-02 VITALS
DIASTOLIC BLOOD PRESSURE: 90 MMHG | OXYGEN SATURATION: 97 % | SYSTOLIC BLOOD PRESSURE: 160 MMHG | BODY MASS INDEX: 42.53 KG/M2 | WEIGHT: 247.8 LBS | RESPIRATION RATE: 18 BRPM | HEART RATE: 109 BPM | TEMPERATURE: 97.6 F

## 2025-06-02 DIAGNOSIS — I10 HYPERTENSION: ICD-10-CM

## 2025-06-02 DIAGNOSIS — L97.511 ULCER OF RIGHT FOOT, LIMITED TO BREAKDOWN OF SKIN (HCC): Primary | ICD-10-CM

## 2025-06-02 PROCEDURE — 99213 OFFICE O/P EST LOW 20 MIN: CPT | Performed by: PHYSICIAN ASSISTANT

## 2025-06-02 RX ORDER — SULFAMETHOXAZOLE AND TRIMETHOPRIM 800; 160 MG/1; MG/1
1 TABLET ORAL DAILY
Qty: 5 TABLET | Refills: 0 | Status: SHIPPED | OUTPATIENT
Start: 2025-06-02 | End: 2025-06-07

## 2025-06-02 RX ORDER — LABETALOL 100 MG/1
100 TABLET, FILM COATED ORAL 2 TIMES DAILY
Qty: 60 TABLET | Refills: 0 | Status: SHIPPED | OUTPATIENT
Start: 2025-06-02

## 2025-06-02 NOTE — PATIENT INSTRUCTIONS
"Patient Education    Hypertension without medications  Refill of labetalol for 1 month was provided  It was explained that we will not be refilling this medication again and that she would need to establish herself or, reestablish herself with her primary care provider.  Patient expressed verbal understanding    Tylenol 500 mg 1 tablet every 6 hours as needed  Ibuprofen 200 mg 1 tablet every 6 hours as needed for additional pain relief  Bactrim DS 1 tablet twice daily for 5 days  Bacitracin ointment apply once daily  Use your pressure stockings as directed daily  Elevate your feet when sitting    PCP referral generated on your behalf as requested  Wound care referral generated    Follow up with PCP in 3-5 days.  Proceed to  ER if symptoms worsen.     Peripheral artery disease and claudication   The Basics   Written by the doctors and editors at [facility]   What is peripheral artery disease? -- Peripheral artery disease, or \"PAD,\" is a condition that affects the blood vessels (called arteries) that bring blood to the legs. PAD can cause muscle pain that gets worse with activity and better with rest. This is called \"claudication.\" PAD can also cause leg wounds to heal more slowly than usual. This article is only about the leg pain related to PAD.  Normally, blood flows easily through arteries to all parts of the body. But sometimes, fatty clumps called \"plaque\" build up inside the walls of arteries (figure 1). Plaque can cause arteries to become narrow or blocked. This prevents blood from flowing normally. When muscles do not get enough blood, symptoms can happen.  Some people have a greater chance of getting PAD, such as those who:   Smoke   Have diabetes   Have high cholesterol   Have high blood pressure  What are the symptoms of PAD? -- PAD often causes pain in the back of the lower leg. The pain usually gets worse with walking or other exercise, and gets better with rest. PAD can also cause pain in the buttocks, " thighs, or sometimes in the feet. People who have leg pain can have other symptoms, too, such as:   Trouble walking up stairs   Trouble with sexual arousal  Symptoms of claudication can be mild or severe, depending on:   Which arteries are affected, and how many   How narrow the arteries are   How much activity a person does  Is there a test for PAD? -- Yes. Your doctor or nurse can do different tests to find out if you have PAD, and to check how severe it is. They might:   Take the blood pressure in your arm and lower leg (just above the ankle) - This is done at rest and right after exercise, then the numbers are compared.   Take the blood pressure in other places in your leg, like the thigh   Order a blood vessel imaging test, such as an ultrasound - This can show pictures of the arteries that bring blood to the leg.  How can I help treat my PAD? -- To help treat your PAD and prevent it from getting worse, you can:   Stop smoking.   Get your diabetes, high blood pressure, and high cholesterol under control (if you have these conditions).   Walking - Doctors recommend that most people with PAD walk each day. Ask your doctor or nurse how best to begin a walking program.  What other treatments might I have? -- Along with a walking program and getting medical conditions under control, some people are also treated with medicines. The medicines used to treat PAD can reduce symptoms, increase blood flow to the legs, and help people walk farther without pain. Most doctors ask their patients to try a medicine called cilostazol (brand name: Pletal). But people who have certain heart problems cannot take cilostazol and must take a different medicine.  If you still have severe symptoms after trying medicines, your doctor will talk with you about the possibility of having a procedure to increase blood flow to your legs and feet. Your treatment options might include:   Angioplasty or stenting - For these procedures, the doctor  "inserts a thin tube with a balloon at the end of it into the part of the artery that is blocked. Then, they inflate the balloon to open the blockage. Sometimes, the doctor might need to prop open the artery using a tiny mesh tube called a \"stent,\" which stays in the body.   Open patch angioplasty - This involves cutting open the artery, cleaning out the plaque, then closing or \"patching\" the cut.   Bypass surgery - During bypass surgery, the doctor removes a piece of a vein from another part of the body. Then, they reattach that piece (called a vein graft) above and below the area that is blocked. This re-routes blood around the blocked area, and allows it to get to the part of the leg that was not getting enough blood. Sometimes, instead of taking a vein from another part of the body, the doctor can use an artificial graft.  What should I know about the different procedures? -- Angioplasty and stenting and patch angioplasty work best for treating blocked areas that are short. Surgery works better long term for treating blocked areas that are long. People who have the fewest long-term problems after bypass surgery include those who are younger than 70 and do not have diabetes.  Your doctor might recommend a particular procedure for you, depending on your symptoms, age, and medical problems. But many people can choose which procedure to have. If your doctor offers you a choice, ask:   What are the benefits of each procedure for me?   What are the downsides of each procedure for me?   What happens if I do not have any procedure?  All topics are updated as new evidence becomes available and our peer review process is complete.  This topic retrieved from [employer] on: Feb 26, 2024.  Topic 50762 Version 16.0  Release: 32.2.4 - C32.56  © 2024 UpToDate, Inc. and/or its affiliates. All rights reserved.  figure 1: Peripheral artery disease     Graphic 72435 Version 6.0  Consumer Information Use and Disclaimer   Disclaimer: " This generalized information is a limited summary of diagnosis, treatment, and/or medication information. It is not meant to be comprehensive and should be used as a tool to help the user understand and/or assess potential diagnostic and treatment options. It does NOT include all information about conditions, treatments, medications, side effects, or risks that may apply to a specific patient. It is not intended to be medical advice or a substitute for the medical advice, diagnosis, or treatment of a health care provider based on the health care provider's examination and assessment of a patient's specific and unique circumstances. Patients must speak with a health care provider for complete information about their health, medical questions, and treatment options, including any risks or benefits regarding use of medications. This information does not endorse any treatments or medications as safe, effective, or approved for treating a specific patient. UpToDate, Inc. and its affiliates disclaim any warranty or liability relating to this information or the use thereof.The use of this information is governed by the Terms of Use, available at https://www.wolterskluwer.com/en/know/clinical-effectiveness-terms. 2024© UpToDate, Inc. and its affiliates and/or licensors. All rights reserved.  Copyright   © 2024 UpToDate, Inc. and/or its affiliates. All rights reserved.

## 2025-06-02 NOTE — PROGRESS NOTES
[facility]        NAME: Ana Diehl is a 44 y.o. female  : 1980    MRN: [id number]  DATE: 2025  TIME: 7:25 PM    Assessment and Plan   Ulcer of right foot, limited to breakdown of skin (HCC) [L97.511]  1. Ulcer of right foot, limited to breakdown of skin (HCC)  sulfamethoxazole-trimethoprim (BACTRIM DS) 800-160 mg per tablet    Ambulatory Referral to Family Practice    Ambulatory Referral to Wound Care      2. Hypertension  labetalol (NORMODYNE) 100 mg tablet    Ambulatory Referral to Family Practice    Ambulatory Referral to Wound Care            Patient Instructions     Hypertension without medications  Refill of labetalol for 1 month was provided  It was explained that we will not be refilling this medication again and that she would need to establish herself or, reestablish herself with her primary care provider.  Patient expressed verbal understanding    Tylenol 500 mg 1 tablet every 6 hours as needed  Ibuprofen 200 mg 1 tablet every 6 hours as needed for additional pain relief  Bactrim DS 1 tablet twice daily for 5 days  Bacitracin ointment apply once daily  Use your pressure stockings as directed daily  Elevate your feet when sitting    PCP referral generated on your behalf as requested  Wound care referral generated    Follow up with PCP in 3-5 days.  Proceed to  ER if symptoms worsen.    Chief Complaint     Chief Complaint   Patient presents with    Foot Pain     Right foot pain with  ulcer 1-2 weeks ago.          History of Present Illness       44-year-old pleasant female is presenting today with complaint of a painful ulcer developing on the lateral aspect of her right foot for the last 2 weeks.  Also the patient has a history of hypertension in which she has been without her medications for approximately 4-5-month due to medical insurance issues, as per patient.  She reports she does not have a PCP however epic reveals that there is one assigned.  She has not seen her PCP for  several months.        Review of Systems   Review of Systems   Constitutional:  Negative for activity change, appetite change, chills, fatigue and fever.   Respiratory:  Negative for shortness of breath.    Cardiovascular:  Negative for chest pain and palpitations.   Gastrointestinal:  Negative for diarrhea, nausea and vomiting.   Musculoskeletal:  Positive for joint swelling.   Skin:  Positive for wound.   Neurological:  Negative for dizziness, weakness, light-headedness and headaches.         Current Medications     Current Medications[1]    Current Allergies     Allergies as of 06/02/2025 - Reviewed 06/02/2025   Allergen Reaction Noted    Penicillins Anaphylaxis 04/30/2016    Ambien [zolpidem tartrate]  04/30/2016    Amitriptyline Hives 04/30/2016    Benadryl [diphenhydramine hcl (sleep)]  04/30/2016    Ceclor [cefaclor] Hives 04/30/2016    Ciprofibrate Hives 04/20/2018    Doxycycline GI Intolerance 08/10/2015    Erythromycin Other (See Comments) 08/10/2015    Gabapentin Other (See Comments) 08/10/2015    Lyrica [pregabalin]  04/30/2016    Morphine  03/09/2017    Morphine and codeine  04/30/2016    Ultram [tramadol]  04/30/2016            The following portions of the patient's history were reviewed and updated as appropriate: allergies, current medications, past family history, past medical history, past social history, past surgical history and problem list.     Past Medical History[2]    Past Surgical History[3]    Family History[4]      Medications have been verified.        Objective   /90   Pulse (!) 109   Temp 97.6 °F (36.4 °C)   Resp 18   Wt 112 kg (247 lb 12.8 oz)   SpO2 97%   BMI 42.53 kg/m²        Physical Exam     Physical Exam  Vitals and nursing note reviewed.   Constitutional:       General: She is not in acute distress.     Appearance: Normal appearance. She is obese. She is not ill-appearing, toxic-appearing or diaphoretic.     Eyes:      General: No scleral icterus.     Extraocular  Movements: Extraocular movements intact.      Pupils: Pupils are equal, round, and reactive to light.       Cardiovascular:      Rate and Rhythm: Normal rate and regular rhythm.      Pulses: Normal pulses.      Heart sounds: Normal heart sounds.   Pulmonary:      Effort: Pulmonary effort is normal. No respiratory distress.      Breath sounds: Normal breath sounds.     Musculoskeletal:         General: Normal range of motion.      Cervical back: Normal range of motion.        Feet:    Feet:      Right foot:      Skin integrity: Ulcer and erythema present.      Toenail Condition: Right toenails are normal.      Comments: 0.5 cm superficial ulcer noted below the right lateral malleolus.  Surrounding halo of approximately 2 cm noted.  Tender on palpation.  No oozing.    There is positive pedal edema, bilateral.  Positive varicose veins/spider veins.    Neurological:      General: No focal deficit present.      Mental Status: She is alert and oriented to person, place, and time.      Coordination: Coordination normal.      Gait: Gait abnormal.     Psychiatric:         Mood and Affect: Mood normal.         Behavior: Behavior normal.         Thought Content: Thought content normal.         Judgment: Judgment normal.                        [1]   Current Outpatient Medications:     acetaminophen (TYLENOL) 500 mg tablet, Take 2 tablets (1,000 mg total) by mouth every 8 (eight) hours, Disp: 40 tablet, Rfl: 0    amphetamine-dextroamphetamine (ADDERALL) 10 mg tablet, , Disp: , Rfl:     ibuprofen (MOTRIN) 600 mg tablet, Take 1 tablet (600 mg total) by mouth every 6 (six) hours as needed for mild pain or moderate pain, Disp: 30 tablet, Rfl: 0    labetalol (NORMODYNE) 100 mg tablet, Take 1 tablet (100 mg total) by mouth 2 (two) times a day, Disp: 60 tablet, Rfl: 0    lisdexamfetamine (VYVANSE) 70 MG capsule, Take 70 mg by mouth every morning, Disp: , Rfl:     sulfamethoxazole-trimethoprim (BACTRIM DS) 800-160 mg per tablet, Take 1  tablet by mouth daily for 5 days, Disp: 5 tablet, Rfl: 0    albuterol (2.5 mg/3 mL) 0.083 % nebulizer solution, Take 3 mL (2.5 mg total) by nebulization every 6 (six) hours as needed for wheezing or shortness of breath, Disp: 75 mL, Rfl: 0    albuterol (ProAir HFA) 90 mcg/act inhaler, Inhale 2 puffs every 6 (six) hours as needed for wheezing, Disp: 8.5 g, Rfl: 0    Ascorbic Acid (VITAMIN C) 100 MG tablet, Take 100 mg by mouth daily., Disp: , Rfl:     Biotin 300 MCG TABS, Take by mouth., Disp: , Rfl:     buPROPion (WELLBUTRIN XL) 150 mg 24 hr tablet, , Disp: , Rfl:     cetirizine-pseudoephedrine (ZyrTEC-D) 5-120 MG per tablet, TAKE 1 TABLET BY MOUTH 2 (TWO) TIMES A DAY FOR 10 DAYS., Disp: , Rfl:     chlorhexidine (PERIDEX) 0.12 % solution, Apply 15 mL to the mouth or throat 2 (two) times a day, Disp: 120 mL, Rfl: 0    diphenhydramine, lidocaine, Al/Mg hydroxide, simethicone (Magic Mouthwash) SUSP, Swish and spit 10 mL every 4 (four) hours as needed for mouth pain or discomfort, Disp: 119 mL, Rfl: 0    ondansetron (ZOFRAN) 4 mg tablet, Take 1 tablet (4 mg total) by mouth every 6 (six) hours, Disp: 12 tablet, Rfl: 0    Specialty Vitamins Products (MAGNESIUM, AMINO ACID CHELATE,) 133 MG tablet, Take 1 tablet by mouth 2 (two) times a day., Disp: , Rfl:   [2]   Past Medical History:  Diagnosis Date    ADHD (attention deficit hyperactivity disorder)     Allergic     Arthritis     Asthma     Chronic pain     Clotting disorder (HCC) 2019    Post-partum hemorrhage    Depression 2007    Following spinal injury    Fibroids     Headache(784.0)     Hypertension     Hypothyroidism     Migraine     Obesity     Papanicolaou smear for cervical cancer screening 2018    PCOS (polycystic ovarian syndrome)     RSD (reflex sympathetic dystrophy)     Spinal stenosis    [3]   Past Surgical History:  Procedure Laterality Date     SECTION  2019   [4]   Family History  Problem Relation Name Age of Onset     Alcohol abuse Mother Paris     Heart disease Sister Tracy     Diabetes Maternal Grandmother Azeb     Esophageal cancer Maternal Grandfather      Osteoporosis Paternal Grandmother Simona     Hypertension Paternal Grandmother Simona     Esophageal cancer Paternal Grandfather      Lung cancer Other [facility]

## 2025-06-03 ENCOUNTER — DOCUMENTATION (OUTPATIENT)
Dept: ADMINISTRATIVE | Facility: OTHER | Age: 45
End: 2025-06-03

## 2025-06-03 VITALS — SYSTOLIC BLOOD PRESSURE: 160 MMHG | DIASTOLIC BLOOD PRESSURE: 90 MMHG

## 2025-06-03 NOTE — PROGRESS NOTES
Ysabel MITCHELL Patient Reported Team  Blood pressure elevated  Appointment department: [facility]  Appointment provider: Severo Christiansen PA-C    Blood pressure  06/02/25 1905 160/90  06/02/25 1850 162/94      06/03/25 12:36 PM    Patient was called after the Urgent Care visit ; a message was left for the patient to return the call    Thank you.  Maryana Villalobos MA  [facility]

## 2025-06-04 RX ORDER — LABETALOL 100 MG/1
100 TABLET, FILM COATED ORAL 2 TIMES DAILY
Qty: 180 TABLET | OUTPATIENT
Start: 2025-06-04

## 2025-06-04 NOTE — TELEPHONE ENCOUNTER
Patient was provided  1 month supply only as we at Urgent care do not prescribe greater than. Additional refills to be obtained by primary care provider since Pt. Is in need of follow up.   
No

## 2025-06-27 DIAGNOSIS — I10 HYPERTENSION: ICD-10-CM

## 2025-06-29 RX ORDER — LABETALOL 100 MG/1
100 TABLET, FILM COATED ORAL 2 TIMES DAILY
Qty: 60 TABLET | Refills: 0 | OUTPATIENT
Start: 2025-06-29

## 2025-06-29 NOTE — TELEPHONE ENCOUNTER
Patient was seen at urgent care initially and is no longer under the this provider's care.   Pt. encouraged to follow up with their primary care provider for additional care and/or refill requests.